# Patient Record
Sex: FEMALE | Race: BLACK OR AFRICAN AMERICAN | Employment: UNEMPLOYED | URBAN - METROPOLITAN AREA
[De-identification: names, ages, dates, MRNs, and addresses within clinical notes are randomized per-mention and may not be internally consistent; named-entity substitution may affect disease eponyms.]

---

## 2017-05-26 ENCOUNTER — OFFICE VISIT (OUTPATIENT)
Dept: INTERNAL MEDICINE CLINIC | Age: 37
End: 2017-05-26

## 2017-05-26 VITALS
TEMPERATURE: 96.3 F | DIASTOLIC BLOOD PRESSURE: 44 MMHG | RESPIRATION RATE: 17 BRPM | SYSTOLIC BLOOD PRESSURE: 93 MMHG | HEIGHT: 64 IN | BODY MASS INDEX: 25.99 KG/M2 | HEART RATE: 59 BPM | OXYGEN SATURATION: 100 % | WEIGHT: 152.2 LBS

## 2017-05-26 DIAGNOSIS — Z00.00 WELL ADULT ON ROUTINE HEALTH CHECK: Primary | ICD-10-CM

## 2017-05-26 NOTE — PROGRESS NOTES
Subjective:   31 y.o. female for Well Woman Check. No significant past medical history. Onychomycosis resolved. Sometimes her nose feels stuffy and some dry cough otherwise she is doing well. Her gyne and breast care is done elsewhere by her Ob-Gyne physician. Patient Active Problem List   Diagnosis Code    Onychomycosis B35.1     Current Outpatient Prescriptions   Medication Sig Dispense Refill    multivitamin (ONE A DAY) tablet Take 1 Tab by mouth daily.  ferrous sulfate 325 mg (65 mg iron) tablet Take  by mouth Daily (before breakfast).  ASCORBIC ACID/VITAMIN E/BIOTIN (HAIR, SKIN, NAILS WITH BIOTIN PO) Take  by mouth.  VIT A/VIT C/VIT E/ZINC/COPPER (OCUVITE PRESERVISION PO) Take  by mouth. No Known Allergies  Past Medical History:   Diagnosis Date    Anemia      Past Surgical History:   Procedure Laterality Date    HX  SECTION      HX REFRACTIVE SURGERY      HX WISDOM TEETH EXTRACTION       Social History   Substance Use Topics    Smoking status: Never Smoker    Smokeless tobacco: Never Used    Alcohol use No        Lab Results  Component Value Date/Time   Cholesterol, total 221 2017 08:57 AM   HDL Cholesterol 92 2017 08:57 AM   LDL, calculated 119 2017 08:57 AM   Triglyceride 48 2017 08:57 AM       Lab Results  Component Value Date/Time   TSH 1.100 2017 08:57 AM   T4, Free 1.05 2017 08:57 AM         ROS: Feeling generally well. No TIA's or unusual headaches, no dysphagia. No prolonged cough. No dyspnea or chest pain on exertion. No abdominal pain, change in bowel habits, black or bloody stools. No urinary tract symptoms. No new or unusual musculoskeletal symptoms. Specific concerns today: none. .    Objective: The patient appears well, alert, oriented x 3, in no distress.   Visit Vitals    BP 93/44 (BP 1 Location: Left arm, BP Patient Position: Sitting)    Pulse (!) 59    Temp 96.3 °F (35.7 °C) (Oral)    Resp 17    Ht 5' 3.5\" (1.613 m)    Wt 152 lb 3.2 oz (69 kg)    LMP 11/04/2016    SpO2 100%    BMI 26.54 kg/m2     ENT normal.  Neck supple. No adenopathy or thyromegaly. MARYURI. Lungs are clear, good air entry, no wheezes, rhonchi or rales. S1 and S2 normal, no murmurs, regular rate and rhythm. Abdomen soft without tenderness, guarding, mass or organomegaly. Extremities show no edema, normal peripheral pulses. Neurological is normal, no focal findings. Breast and Pelvic exams are deferred. Skin: normal.    Assessment/Plan:   Well Woman  increase physical activity, follow low fat diet, follow low salt diet, continue present plan, routine labs ordered, call if any problems    ICD-10-CM ICD-9-CM    1. Well adult on routine health check Z00.00 V70.0 CBC WITH AUTOMATED DIFF      UA/M W/RFLX CULTURE, COMP      TSH AND FREE T4      METABOLIC PANEL, COMPREHENSIVE      LIPID PANEL      HEMOGLOBIN A1C WITH EAG     Latasha Roach was seen today for complete physical and cold symptoms. Diagnoses and all orders for this visit:    Well adult on routine health check  -     CBC WITH AUTOMATED DIFF  -     UA/M W/RFLX CULTURE, COMP  -     TSH AND FREE T4  -     METABOLIC PANEL, COMPREHENSIVE  -     LIPID PANEL  -     HEMOGLOBIN A1C WITH EAG    Other orders  -     MICROSCOPIC EXAMINATION  -     CVD REPORT      Follow-up Disposition:  Return in about 1 year (around 5/26/2018). Sooner if needed.    reviewed diet, exercise and weight control

## 2017-05-26 NOTE — MR AVS SNAPSHOT
Visit Information Date & Time Provider Department Dept. Phone Encounter #  
 5/26/2017  8:15 AM Zeeshan Villatoro MD [facility] [phone] [id number] Follow-up Instructions Return in about 1 year (around 5/26/2018). Upcoming Health Maintenance Date Due DTaP/Tdap/Td series (1 - Tdap) 7/19/2017* INFLUENZA AGE 9 TO ADULT 8/1/2017 PAP AKA CERVICAL CYTOLOGY 1/5/2019 *Topic was postponed. The date shown is not the original due date. Allergies as of 5/26/2017  Review Complete On: 5/26/2017 By: Curtis Wheat LPN No Known Allergies Current Immunizations  Never Reviewed Name Date Influenza Vaccine (Quad) PF 10/14/2016 Not reviewed this visit You Were Diagnosed With   
  
 Codes Comments Well adult on routine health check    -  Primary ICD-10-CM: Z00.00 ICD-9-CM: V70.0 Vitals BP Pulse Temp Resp Height(growth percentile) Weight(growth percentile) 93/44 (BP 1 Location: Left arm, BP Patient Position: Sitting) (!) 59 96.3 °F (35.7 °C) (Oral) 17 5' 3.5\" (1.613 m) 152 lb 3.2 oz (69 kg) LMP SpO2 BMI OB Status Smoking Status 11/04/2016 100% 26.54 kg/m2 Implant Never Smoker Vitals History BMI and BSA Data Body Mass Index Body Surface Area  
 26.54 kg/m 2 1.76 m 2 Preferred Pharmacy Pharmacy Name Phone [facility] [address] [place] [address] [phone] Your Updated Medication List  
  
   
This list is accurate as of: 5/26/17  8:55 AM.  Always use your most recent med list.  
  
  
  
  
 efinaconazole Natalie topical solution Commonly known as:  Geovanny Sol Apply daily. ferrous sulfate 325 mg (65 mg iron) tablet Take  by mouth Daily (before breakfast). HAIR, SKIN, NAILS WITH BIOTIN PO Take  by mouth.  
  
 multivitamin tablet Commonly known as:  ONE A DAY Take 1 Tab by mouth daily. OCUVITE PRESERVISION PO Take  by mouth. We Performed the Following CBC WITH AUTOMATED DIFF [87924 CPT(R)] HEMOGLOBIN A1C WITH EAG [60843 CPT(R)] LIPID PANEL [46736 CPT(R)] METABOLIC PANEL, COMPREHENSIVE [56778 CPT(R)] TSH AND FREE T4 [93180 CPT(R)] UA/M W/RFLX CULTURE, COMP [44001 CPT(R)] Follow-up Instructions Return in about 1 year (around 5/26/2018). Introducing [facility]! Dear Sondra Busch: Thank you for requesting a [employer] account. Our records indicate that you already have an active [employer] account. You can access your account anytime at [email or web address] Did you know that you can access your hospital and ER discharge instructions at any time in [employer]? You can also review all of your test results from your hospital stay or ER visit. Additional Information If you have questions, please visit the Frequently Asked Questions section of the [employer] website at [email or web address] Remember, [employer] is NOT to be used for urgent needs. For medical emergencies, dial 911. Now available from your iPhone and Android! Please provide this summary of care documentation to your next provider. Your primary care clinician is listed as Zeeshan Merrill. If you have any questions after today's visit, please call [phone].

## 2017-05-27 LAB
ALBUMIN SERPL-MCNC: 4.4 G/DL (ref 3.5–5.5)
ALBUMIN/GLOB SERPL: 1.5 {RATIO} (ref 1.2–2.2)
ALP SERPL-CCNC: 39 IU/L (ref 39–117)
ALT SERPL-CCNC: 18 IU/L (ref 0–32)
APPEARANCE UR: ABNORMAL
AST SERPL-CCNC: 25 IU/L (ref 0–40)
BACTERIA #/AREA URNS HPF: NORMAL /[HPF]
BASOPHILS # BLD AUTO: 0 X10E3/UL (ref 0–0.2)
BASOPHILS NFR BLD AUTO: 1 %
BILIRUB SERPL-MCNC: 0.3 MG/DL (ref 0–1.2)
BILIRUB UR QL STRIP: NEGATIVE
BUN SERPL-MCNC: 10 MG/DL (ref 6–20)
BUN/CREAT SERPL: 15 (ref 9–23)
CALCIUM SERPL-MCNC: 9.3 MG/DL (ref 8.7–10.2)
CASTS URNS QL MICRO: NORMAL /LPF
CHLORIDE SERPL-SCNC: 101 MMOL/L (ref 96–106)
CHOLEST SERPL-MCNC: 221 MG/DL (ref 100–199)
CO2 SERPL-SCNC: 25 MMOL/L (ref 18–29)
COLOR UR: YELLOW
CREAT SERPL-MCNC: 0.67 MG/DL (ref 0.57–1)
EOSINOPHIL # BLD AUTO: 0.1 X10E3/UL (ref 0–0.4)
EOSINOPHIL NFR BLD AUTO: 1 %
EPI CELLS #/AREA URNS HPF: NORMAL /HPF
ERYTHROCYTE [DISTWIDTH] IN BLOOD BY AUTOMATED COUNT: 14.1 % (ref 12.3–15.4)
EST. AVERAGE GLUCOSE BLD GHB EST-MCNC: 126 MG/DL
GLOBULIN SER CALC-MCNC: 3 G/DL (ref 1.5–4.5)
GLUCOSE SERPL-MCNC: 77 MG/DL (ref 65–99)
GLUCOSE UR QL: NEGATIVE
HBA1C MFR BLD: 6 % (ref 4.8–5.6)
HCT VFR BLD AUTO: 39.8 % (ref 34–46.6)
HDLC SERPL-MCNC: 92 MG/DL
HGB BLD-MCNC: 12.6 G/DL (ref 11.1–15.9)
HGB UR QL STRIP: NEGATIVE
IMM GRANULOCYTES # BLD: 0 X10E3/UL (ref 0–0.1)
IMM GRANULOCYTES NFR BLD: 0 %
INTERPRETATION, 910389: NORMAL
KETONES UR QL STRIP: NEGATIVE
LDLC SERPL CALC-MCNC: 119 MG/DL (ref 0–99)
LEUKOCYTE ESTERASE UR QL STRIP: NEGATIVE
LYMPHOCYTES # BLD AUTO: 1.9 X10E3/UL (ref 0.7–3.1)
LYMPHOCYTES NFR BLD AUTO: 36 %
MCH RBC QN AUTO: 29.9 PG (ref 26.6–33)
MCHC RBC AUTO-ENTMCNC: 31.7 G/DL (ref 31.5–35.7)
MCV RBC AUTO: 95 FL (ref 79–97)
MICRO URNS: ABNORMAL
MICRO URNS: ABNORMAL
MONOCYTES # BLD AUTO: 0.5 X10E3/UL (ref 0.1–0.9)
MONOCYTES NFR BLD AUTO: 9 %
NEUTROPHILS # BLD AUTO: 2.9 X10E3/UL (ref 1.4–7)
NEUTROPHILS NFR BLD AUTO: 53 %
NITRITE UR QL STRIP: NEGATIVE
PH UR STRIP: 7.5 [PH] (ref 5–7.5)
PLATELET # BLD AUTO: 325 X10E3/UL (ref 150–379)
POTASSIUM SERPL-SCNC: 4.2 MMOL/L (ref 3.5–5.2)
PROT SERPL-MCNC: 7.4 G/DL (ref 6–8.5)
PROT UR QL STRIP: NEGATIVE
RBC # BLD AUTO: 4.21 X10E6/UL (ref 3.77–5.28)
RBC #/AREA URNS HPF: NORMAL /HPF
SODIUM SERPL-SCNC: 141 MMOL/L (ref 134–144)
SP GR UR: <=1.005 (ref 1–1.03)
T4 FREE SERPL-MCNC: 1.05 NG/DL (ref 0.82–1.77)
TRIGL SERPL-MCNC: 48 MG/DL (ref 0–149)
TSH SERPL DL<=0.005 MIU/L-ACNC: 1.1 UIU/ML (ref 0.45–4.5)
URINALYSIS REFLEX , 377201: ABNORMAL
UROBILINOGEN UR STRIP-MCNC: 0.2 MG/DL (ref 0.2–1)
VLDLC SERPL CALC-MCNC: 10 MG/DL (ref 5–40)
WBC # BLD AUTO: 5.4 X10E3/UL (ref 3.4–10.8)
WBC #/AREA URNS HPF: NORMAL /HPF

## 2017-05-31 NOTE — PROGRESS NOTES
Please call her. 1. CBC, kidney, liver, thyroid, UA level is within normal range. 2. Cholesterol level is  mildly elevated. No need to start any medication at this point. Continue watching your diet, eat healthy, less mac and fatty food, more baked or grilled or broiled food. Exercise 150 minutes/week will be helpful as well. Will recheck level in one year. 3. Prediabetes: Average blood sugar( Hg A1c ) is 6.0, in the range of prediabetic level. Prediabetes is a warning sign that you are at risk for getting type 2 diabetes. It means that your blood sugar is higher than it should be. Most people who get type 2 diabetes have prediabetes first. The good news is that lifestyle changes may help you get your blood sugar back to normal and avoid or delay diabetes. Will recheck this level in 6 months.

## 2017-09-09 ENCOUNTER — APPOINTMENT (OUTPATIENT)
Dept: CT IMAGING | Age: 37
End: 2017-09-09
Attending: EMERGENCY MEDICINE
Payer: COMMERCIAL

## 2017-09-09 ENCOUNTER — HOSPITAL ENCOUNTER (EMERGENCY)
Age: 37
Discharge: HOME | End: 2017-09-09
Attending: EMERGENCY MEDICINE
Payer: COMMERCIAL

## 2017-09-09 VITALS
OXYGEN SATURATION: 100 % | SYSTOLIC BLOOD PRESSURE: 132 MMHG | DIASTOLIC BLOOD PRESSURE: 67 MMHG | HEART RATE: 72 BPM | TEMPERATURE: 99.1 F | WEIGHT: 155.87 LBS | BODY MASS INDEX: 26.61 KG/M2 | RESPIRATION RATE: 18 BRPM | HEIGHT: 64 IN

## 2017-09-09 DIAGNOSIS — K46.0 INCARCERATED HERNIA: Primary | ICD-10-CM

## 2017-09-09 LAB
ALBUMIN SERPL-MCNC: 3.6 G/DL (ref 3.5–5)
ALBUMIN/GLOB SERPL: 0.9 {RATIO} (ref 1.1–2.2)
ALP SERPL-CCNC: 40 U/L (ref 45–117)
ALT SERPL-CCNC: 25 U/L (ref 12–78)
ANION GAP SERPL CALC-SCNC: 4 MMOL/L (ref 5–15)
AST SERPL-CCNC: 19 U/L (ref 15–37)
BASOPHILS # BLD: 0 K/UL (ref 0–0.1)
BASOPHILS NFR BLD: 0 % (ref 0–1)
BILIRUB SERPL-MCNC: 0.3 MG/DL (ref 0.2–1)
BUN SERPL-MCNC: 13 MG/DL (ref 6–20)
BUN/CREAT SERPL: 15 (ref 12–20)
CALCIUM SERPL-MCNC: 8.6 MG/DL (ref 8.5–10.1)
CHLORIDE SERPL-SCNC: 104 MMOL/L (ref 97–108)
CO2 SERPL-SCNC: 29 MMOL/L (ref 21–32)
CREAT BLD-MCNC: 0.8 MG/DL (ref 0.6–1.3)
CREAT SERPL-MCNC: 0.87 MG/DL (ref 0.55–1.02)
DIFFERENTIAL METHOD BLD: NORMAL
EOSINOPHIL # BLD: 0.1 K/UL (ref 0–0.4)
EOSINOPHIL NFR BLD: 1 % (ref 0–7)
ERYTHROCYTE [DISTWIDTH] IN BLOOD BY AUTOMATED COUNT: 13.3 % (ref 11.5–14.5)
GLOBULIN SER CALC-MCNC: 3.8 G/DL (ref 2–4)
GLUCOSE SERPL-MCNC: 85 MG/DL (ref 65–100)
HCT VFR BLD AUTO: 35.7 % (ref 35–47)
HGB BLD-MCNC: 11.7 G/DL (ref 11.5–16)
LYMPHOCYTES # BLD: 2.4 K/UL (ref 0.8–3.5)
LYMPHOCYTES NFR BLD: 46 % (ref 12–49)
MCH RBC QN AUTO: 29.6 PG (ref 26–34)
MCHC RBC AUTO-ENTMCNC: 32.8 G/DL (ref 30–36.5)
MCV RBC AUTO: 90.4 FL (ref 80–99)
MONOCYTES # BLD: 0.6 K/UL (ref 0–1)
MONOCYTES NFR BLD: 10 % (ref 5–13)
NEUTS SEG # BLD: 2.4 K/UL (ref 1.8–8)
NEUTS SEG NFR BLD: 43 % (ref 32–75)
PLATELET # BLD AUTO: 300 K/UL (ref 150–400)
POTASSIUM SERPL-SCNC: 3.4 MMOL/L (ref 3.5–5.1)
PROT SERPL-MCNC: 7.4 G/DL (ref 6.4–8.2)
RBC # BLD AUTO: 3.95 M/UL (ref 3.8–5.2)
RBC MORPH BLD: NORMAL
SODIUM SERPL-SCNC: 137 MMOL/L (ref 136–145)
WBC # BLD AUTO: 5.5 K/UL (ref 3.6–11)

## 2017-09-09 PROCEDURE — 80053 COMPREHEN METABOLIC PANEL: CPT | Performed by: EMERGENCY MEDICINE

## 2017-09-09 PROCEDURE — 36415 COLL VENOUS BLD VENIPUNCTURE: CPT | Performed by: EMERGENCY MEDICINE

## 2017-09-09 PROCEDURE — 74177 CT ABD & PELVIS W/CONTRAST: CPT

## 2017-09-09 PROCEDURE — 82565 ASSAY OF CREATININE: CPT

## 2017-09-09 PROCEDURE — 85025 COMPLETE CBC W/AUTO DIFF WBC: CPT | Performed by: EMERGENCY MEDICINE

## 2017-09-09 PROCEDURE — 74011250636 HC RX REV CODE- 250/636: Performed by: EMERGENCY MEDICINE

## 2017-09-09 PROCEDURE — 96374 THER/PROPH/DIAG INJ IV PUSH: CPT

## 2017-09-09 PROCEDURE — 99283 EMERGENCY DEPT VISIT LOW MDM: CPT

## 2017-09-09 PROCEDURE — 74011636320 HC RX REV CODE- 636/320: Performed by: EMERGENCY MEDICINE

## 2017-09-09 RX ORDER — SODIUM CHLORIDE 9 MG/ML
50 INJECTION, SOLUTION INTRAVENOUS
Status: COMPLETED | OUTPATIENT
Start: 2017-09-09 | End: 2017-09-09

## 2017-09-09 RX ORDER — HYDROCODONE BITARTRATE AND ACETAMINOPHEN 5; 325 MG/1; MG/1
1 TABLET ORAL
Qty: 20 TAB | Refills: 0 | Status: SHIPPED | OUTPATIENT
Start: 2017-09-09 | End: 2018-05-31

## 2017-09-09 RX ORDER — FENTANYL CITRATE 50 UG/ML
50 INJECTION, SOLUTION INTRAMUSCULAR; INTRAVENOUS
Status: COMPLETED | OUTPATIENT
Start: 2017-09-09 | End: 2017-09-09

## 2017-09-09 RX ORDER — SODIUM CHLORIDE 0.9 % (FLUSH) 0.9 %
10 SYRINGE (ML) INJECTION
Status: COMPLETED | OUTPATIENT
Start: 2017-09-09 | End: 2017-09-09

## 2017-09-09 RX ADMIN — SODIUM CHLORIDE 50 ML/HR: 900 INJECTION, SOLUTION INTRAVENOUS at 20:09

## 2017-09-09 RX ADMIN — IOPAMIDOL 100 ML: 755 INJECTION, SOLUTION INTRAVENOUS at 20:09

## 2017-09-09 RX ADMIN — Medication 10 ML: at 20:09

## 2017-09-09 RX ADMIN — FENTANYL CITRATE 50 MCG: 50 INJECTION, SOLUTION INTRAMUSCULAR; INTRAVENOUS at 18:43

## 2017-09-09 NOTE — ED TRIAGE NOTES
\"I went to [facility] and the doctor sent me here so a surgeon can look at me, he things I have a strangulated hernia. \" Patient has moderate sized lump above umbilicus, tender to palpation. Last BM was yesterday. Nausea with eating. Patient first began having symptoms last Thursday (10 days ago).

## 2017-09-10 NOTE — ED PROVIDER NOTES
HPI Comments: Dot Holman is a 31 y.o. female with hx of  and anemia who presents ambulatory to the ED c/o knot to abdomen since 17. Pt states the knot has become uncomfortable and painful recently, reaching to the point where pt had difficulty ambulating. She notes feeling nauseas with eating. She states she has been passing flatulence and BM normally. Pt denies taking pain medications. She reports visiting [facility] today. The physician referred the pt to the ED to check for a possible hernia. Pt specifically denies fever, chills, congestion, sore throat, rhinorrhea, cough, SOB, CP, vomiting, diarrhea, dysuria, myalgias, HA, dizziness, and lightheadedness. She denies chance of pregnancy stating she is currently on her menstrual cycle. Social hx: - Tobacco use, - EtOH use, - Illicit drug use    PCP: Hector Bernabe MD    There are no other complaints, changes or physical findings at this time. The history is provided by the patient. No  was used. Past Medical History:   Diagnosis Date    Anemia        Past Surgical History:   Procedure Laterality Date    HX  SECTION      HX REFRACTIVE SURGERY      HX WISDOM TEETH EXTRACTION           Family History:   Problem Relation Age of Onset    Hypertension Mother     Hypertension Father     Stroke Paternal Grandmother     Diabetes Paternal Grandmother     Stroke Paternal Grandfather     Cancer Maternal Aunt 60     breast cancer       Social History     Social History    Marital status:      Spouse name: N/A    Number of children: N/A    Years of education: N/A     Occupational History    Not on file.      Social History Main Topics    Smoking status: Never Smoker    Smokeless tobacco: Never Used    Alcohol use No    Drug use: No    Sexual activity: Yes     Partners: Male     Birth control/ protection: Implant     Other Topics Concern    Not on file     Social History Narrative ALLERGIES: Review of patient's allergies indicates no known allergies. Review of Systems   Constitutional: Negative. Negative for chills, diaphoresis and fever. HENT: Negative. Negative for congestion, sore throat and trouble swallowing. Eyes: Negative. Negative for photophobia, pain and redness. Respiratory: Negative. Negative for cough, chest tightness, shortness of breath and wheezing. Cardiovascular: Negative. Negative for chest pain and palpitations. Gastrointestinal: Positive for abdominal pain (associated with knot) and nausea. Negative for blood in stool, constipation, diarrhea and vomiting. Genitourinary: Negative for difficulty urinating, dysuria and frequency. Musculoskeletal: Negative. Negative for arthralgias, myalgias, neck pain and neck stiffness. Skin: Negative. Neurological: Negative. Negative for dizziness, tremors, seizures, syncope, speech difficulty, weakness, light-headedness, numbness and headaches. Psychiatric/Behavioral: Negative. Negative for confusion. The patient is not nervous/anxious. All other systems reviewed and are negative. Patient Vitals for the past 12 hrs:   Temp Pulse Resp BP SpO2   09/09/17 2041 - - - - 100 %   09/09/17 2011 - - - - 100 %   09/09/17 1754 99.1 °F (37.3 °C) 72 18 132/67 100 %       Physical Exam   Constitutional: She is oriented to person, place, and time. She appears well-developed and well-nourished. HENT:   Head: Normocephalic and atraumatic. Eyes: Conjunctivae and EOM are normal.   Neck: Normal range of motion. Neck supple. Cardiovascular: Normal rate and regular rhythm. Pulmonary/Chest: Effort normal and breath sounds normal. No respiratory distress. Abdominal: Soft. She exhibits no distension. Ventral hernia superior to umbilicus. Exquisitely TTP. Unable to reduce. Musculoskeletal: Normal range of motion. Neurological: She is alert and oriented to person, place, and time.    Skin: Skin is warm and dry. Psychiatric: She has a normal mood and affect. Nursing note and vitals reviewed. MDM  Number of Diagnoses or Management Options  Incarcerated hernia:   Diagnosis management comments: Patient presents with abdominal pain. DDx: Gastroenteritis, SBO, appendicitis, colitis, IBD, diverticulitis, mesenteric ischemia, AAA or descending dissection, ACS, ureteral stone, incarcerated vs. Strangulated hernia Will get labs and CT Abdomen. Amount and/or Complexity of Data Reviewed  Clinical lab tests: reviewed and ordered  Tests in the radiology section of CPT®: ordered and reviewed  Review and summarize past medical records: yes    Patient Progress  Patient progress: stable    ED Course       Procedures    LABORATORY TESTS:  Recent Results (from the past 12 hour(s))   CBC WITH AUTOMATED DIFF    Collection Time: 09/09/17  6:31 PM   Result Value Ref Range    WBC 5.5 3.6 - 11.0 K/uL    RBC 3.95 3.80 - 5.20 M/uL    HGB 11.7 11.5 - 16.0 g/dL    HCT 35.7 35.0 - 47.0 %    MCV 90.4 80.0 - 99.0 FL    MCH 29.6 26.0 - 34.0 PG    MCHC 32.8 30.0 - 36.5 g/dL    RDW 13.3 11.5 - 14.5 %    PLATELET 662 771 - 492 K/uL    NEUTROPHILS 43 32 - 75 %    LYMPHOCYTES 46 12 - 49 %    MONOCYTES 10 5 - 13 %    EOSINOPHILS 1 0 - 7 %    BASOPHILS 0 0 - 1 %    ABS. NEUTROPHILS 2.4 1.8 - 8.0 K/UL    ABS. LYMPHOCYTES 2.4 0.8 - 3.5 K/UL    ABS. MONOCYTES 0.6 0.0 - 1.0 K/UL    ABS. EOSINOPHILS 0.1 0.0 - 0.4 K/UL    ABS.  BASOPHILS 0.0 0.0 - 0.1 K/UL    DF SMEAR SCANNED      RBC COMMENTS NORMOCYTIC, NORMOCHROMIC     METABOLIC PANEL, COMPREHENSIVE    Collection Time: 09/09/17  6:31 PM   Result Value Ref Range    Sodium 137 136 - 145 mmol/L    Potassium 3.4 (L) 3.5 - 5.1 mmol/L    Chloride 104 97 - 108 mmol/L    CO2 29 21 - 32 mmol/L    Anion gap 4 (L) 5 - 15 mmol/L    Glucose 85 65 - 100 mg/dL    BUN 13 6 - 20 MG/DL    Creatinine 0.87 0.55 - 1.02 MG/DL    BUN/Creatinine ratio 15 12 - 20      GFR est AA >60 >60 ml/min/1.73m2    GFR est non-AA >60 >60 ml/min/1.73m2    Calcium 8.6 8.5 - 10.1 MG/DL    Bilirubin, total 0.3 0.2 - 1.0 MG/DL    ALT (SGPT) 25 12 - 78 U/L    AST (SGOT) 19 15 - 37 U/L    Alk. phosphatase 40 (L) 45 - 117 U/L    Protein, total 7.4 6.4 - 8.2 g/dL    Albumin 3.6 3.5 - 5.0 g/dL    Globulin 3.8 2.0 - 4.0 g/dL    A-G Ratio 0.9 (L) 1.1 - 2.2     POC CREATININE    Collection Time: 09/09/17  6:32 PM   Result Value Ref Range    Creatinine (POC) 0.8 0.6 - 1.3 MG/DL    GFRAA, POC >60 >60 ml/min/1.73m2    GFRNA, POC >60 >60 ml/min/1.73m2       IMAGING RESULTS:  CT Results  (Last 48 hours)               09/09/17 2009  CT ABD PELV W CONT Final result    Impression:  IMPRESSION:   Incarcerated ventral hernia. Narrative:  EXAM:  CT ABD PELV W CONT       INDICATION: umbilical hernia not reducible, painful       COMPARISON: None       CONTRAST:  97 mL of Isovue-370. TECHNIQUE:    Following the uneventful intravenous administration of contrast, thin axial   images were obtained through the abdomen and pelvis. Coronal and sagittal   reconstructions were generated. Oral contrast was not administered. CT dose   reduction was achieved through use of a standardized protocol tailored for this   examination and automatic exposure control for dose modulation. FINDINGS:    LUNG BASES: Clear. INCIDENTALLY IMAGED HEART AND MEDIASTINUM: Unremarkable. LIVER: No mass or biliary dilatation. GALLBLADDER: Unremarkable. SPLEEN: No mass. PANCREAS: No mass or ductal dilatation. ADRENALS: Unremarkable. KIDNEYS: No mass, calculus, or hydronephrosis. STOMACH: Unremarkable. SMALL BOWEL: No dilatation or wall thickening. COLON: No dilatation or wall thickening. APPENDIX: Unremarkable. PERITONEUM: No ascites or pneumoperitoneum. RETROPERITONEUM: No lymphadenopathy or aortic aneurysm. REPRODUCTIVE ORGANS: Normal.   URINARY BLADDER: No mass or calculus. BONES: No destructive bone lesion.    ADDITIONAL COMMENTS: There is a ventral hernia with a 4 mm neck. The hernia sac   measures 3.4x2.2 x 2.1. There is adipose and fluid within the sac. There is no   associated bowel within the hernia sac. MEDICATIONS GIVEN:  Medications   fentaNYL citrate (PF) injection 50 mcg (50 mcg IntraVENous Given 9/9/17 1843)   0.9% sodium chloride infusion (0 mL/hr IntraVENous IV Completed 9/9/17 2048)   iopamidol (ISOVUE-370) 76 % injection 100 mL (100 mL IntraVENous Given 9/9/17 2009)   sodium chloride (NS) flush 10 mL (10 mL IntraVENous Given 9/9/17 2009)       IMPRESSION:  1. Incarcerated hernia        PLAN: Discharge home  1. Discharge Medication List as of 9/9/2017  8:44 PM      START taking these medications    Details   HYDROcodone-acetaminophen (NORCO) 5-325 mg per tablet Take 1 Tab by mouth every four (4) hours as needed for Pain. Max Daily Amount: 6 Tabs., Print, Disp-20 Tab, R-0         CONTINUE these medications which have NOT CHANGED    Details   multivitamin (ONE A DAY) tablet Take 1 Tab by mouth daily. , Historical Med      ferrous sulfate 325 mg (65 mg iron) tablet Take  by mouth Daily (before breakfast). , Historical Med      ASCORBIC ACID/VITAMIN E/BIOTIN (HAIR, SKIN, NAILS WITH BIOTIN PO) Take  by mouth., Historical Med      VIT A/VIT C/VIT E/ZINC/COPPER (OCUVITE PRESERVISION PO) Take  by mouth., Historical Med           2. Follow-up Information     Follow up With Details Comments [address], [place] Schedule an appointment as soon as possible for a visit  [address] [place] [address] [address]  [phone]          3. Return to ED if worse     DISCHARGE NOTE  20:44  The patient has been re-evaluated and is ready for discharge. Reviewed available results with patient. Counseled pt on diagnosis and care plan. Pt has expressed understanding, and all questions have been answered. Pt agrees with plan and agrees to F/U as recommended, or return to the ED if their sxs worsen.  Discharge instructions have been provided and explained to the pt, along with reasons to return to the ED. This note is prepared by Ashley Grimaldo, acting as Scribe for Griselda Sheth M.D. Griselda Sheth M.D: The scribe's documentation has been prepared under my direction and personally reviewed by me in its entirety. I confirm that the note above accurately reflects all work, treatment, procedures, and medical decision making performed by me.

## 2017-09-10 NOTE — ED NOTES
Discharge instructions reviewed with pt and copy given along with RX by ER MD Daljit Sotomayor. Patient ambulatory from ED accompanied by parent in no sign of distress or discomfort.

## 2017-09-10 NOTE — DISCHARGE INSTRUCTIONS
Hernia Repair: Before Your Surgery  What is hernia repair surgery? A hernia occurs when a weak spot in your belly muscles allows a piece of your intestines or the tissue around them to poke through. This can cause a bulge in the area. It can also cause pain. But you may not feel anything. The hernia may be in your groin. Or it may be near your belly button. In some cases, it's in a scar from an earlier surgery. A doctor can fix a hernia through a cut (incision) made near it. This is called open surgery. Or the doctor may make some very small cuts and use a thin, lighted scope and small tools. This is laparoscopic surgery. If your hernia is bulging, the bulge is pushed back into place. The doctor then sews the healthy tissue back together. Often a piece of material is used to patch the weak spot. Open surgery will leave a longer scar. Laparoscopic surgery leaves a few small scars. The scars will fade with time. You may need to take 1 to 2 weeks off from work. This depends on the type of work you do and how you feel. Follow-up care is a key part of your treatment and safety. Be sure to make and go to all appointments, and call your doctor if you are having problems. It's also a good idea to know your test results and keep a list of the medicines you take. What happens before surgery? Surgery can be stressful. This information will help you understand what you can expect. And it will help you safely prepare for surgery. Preparing for surgery  · Understand exactly what surgery is planned, along with the risks, benefits, and other options. · Tell your doctors ALL the medicines, vitamins, supplements, and herbal remedies you take. Some of these can increase the risk of bleeding or interact with anesthesia. · If you take blood thinners, such as warfarin (Coumadin), clopidogrel (Plavix), or aspirin, be sure to talk to your doctor.  He or she will tell you if you should stop taking these medicines before your surgery. Make sure that you understand exactly what your doctor wants you to do. · Your doctor will tell you which medicines to take or stop before your surgery. You may need to stop taking certain medicines a week or more before surgery. So talk to your doctor as soon as you can. · If you have an advance directive, let your doctor know. It may include a living will and a durable power of  for health care. Bring a copy to the hospital. If you don't have one, you may want to prepare one. It lets your doctor and loved ones know your health care wishes. Doctors advise that everyone prepare these papers before any type of surgery or procedure. What happens on the day of surgery? · Follow the instructions exactly about when to stop eating and drinking. If you don't, your surgery may be canceled. If your doctor told you to take your medicines on the day of surgery, take them with only a sip of water. · Take a bath or shower before you come in for your surgery. Do not apply lotions, perfumes, deodorants, or nail polish. · Do not shave the surgical site yourself. · Take off all jewelry and piercings. And take out contact lenses, if you wear them. At the hospital or surgery center  · Bring a picture ID. · The area for surgery is often marked to make sure there are no errors. · You will be kept comfortable and safe by your anesthesia provider. You will be asleep during the surgery. · The surgery will take about 30 minutes to 2 hours. This depends on the size of the hernia and where it is. Going home  · Be sure you have someone to drive you home. Anesthesia and pain medicine make it unsafe for you to drive. · You will be given more specific instructions about recovering from your surgery. They will cover things like diet, wound care, follow-up care, driving, and getting back to your normal routine. When should you call your doctor? · You have questions or concerns.   · You don't understand how to prepare for your surgery. · You become ill before the surgery (such as fever, flu, or a cold). · You need to reschedule or have changed your mind about having the surgery. Where can you learn more? Go to http://bel-clark.info/. Enter T831 in the search box to learn more about \"Hernia Repair: Before Your Surgery. \"  Current as of: August 9, 2016  Content Version: 11.3  © 3427-0156 [employer]. Care instructions adapted under license by [employer] (which disclaims liability or warranty for this information). If you have questions about a medical condition or this instruction, always ask your healthcare professional. [place] [address] any warranty or liability for your use of this information.

## 2017-09-15 ENCOUNTER — OFFICE VISIT (OUTPATIENT)
Dept: SURGERY | Age: 37
End: 2017-09-15

## 2017-09-15 VITALS
RESPIRATION RATE: 18 BRPM | WEIGHT: 152.6 LBS | DIASTOLIC BLOOD PRESSURE: 66 MMHG | OXYGEN SATURATION: 100 % | HEART RATE: 70 BPM | HEIGHT: 64 IN | SYSTOLIC BLOOD PRESSURE: 97 MMHG | BODY MASS INDEX: 26.05 KG/M2

## 2017-09-15 DIAGNOSIS — K42.9 UMBILICAL HERNIA WITHOUT OBSTRUCTION AND WITHOUT GANGRENE: ICD-10-CM

## 2017-09-15 DIAGNOSIS — K43.9 VENTRAL HERNIA WITHOUT OBSTRUCTION OR GANGRENE: Primary | ICD-10-CM

## 2017-09-15 NOTE — MR AVS SNAPSHOT
Visit Information Date & Time Provider Department Dept. Phone Encounter #  
 9/15/2017  3:40 PM Rainer Nichols MD [facility] [id number] Upcoming Health Maintenance Date Due DTaP/Tdap/Td series (1 - Tdap) 11/18/2003 INFLUENZA AGE 9 TO ADULT 8/1/2017 PAP AKA CERVICAL CYTOLOGY 1/5/2019 Allergies as of 9/15/2017  Review Complete On: 9/15/2017 By: Caty Mari No Known Allergies Current Immunizations  Never Reviewed Name Date Influenza Vaccine (Quad) PF 10/14/2016 Not reviewed this visit You Were Diagnosed With   
  
 Codes Comments Ventral hernia without obstruction or gangrene    -  Primary ICD-10-CM: K43.9 ICD-9-CM: 553.20 Umbilical hernia without obstruction and without gangrene     ICD-10-CM: K42.9 ICD-9-CM: 553.1 Vitals BP Pulse Resp Height(growth percentile) Weight(growth percentile) SpO2  
 97/66 (BP 1 Location: Left arm, BP Patient Position: Sitting) 70 18 5' 3.5\" (1.613 m) 152 lb 9.6 oz (69.2 kg) 100% BMI OB Status Smoking Status 26.61 kg/m2 Implant Never Smoker BMI and BSA Data Body Mass Index Body Surface Area  
 26.61 kg/m 2 1.76 m 2 Preferred Pharmacy Pharmacy Name Phone [facility] [address] [place] [address] [phone] Your Updated Medication List  
  
   
This list is accurate as of: 9/15/17 11:59 PM.  Always use your most recent med list.  
  
  
  
  
 ferrous sulfate 325 mg (65 mg iron) tablet Take  by mouth Daily (before breakfast). HAIR, SKIN, NAILS WITH BIOTIN PO Take  by mouth. HYDROcodone-acetaminophen 5-325 mg per tablet Commonly known as:  Rhenda Varner Take 1 Tab by mouth every four (4) hours as needed for Pain. Max Daily Amount: 6 Tabs. multivitamin tablet Commonly known as:  ONE A DAY Take 1 Tab by mouth daily. OCUVITE PRESERVISION PO Take  by mouth. Patient Instructions Surgery Instruction Sheet You have been scheduled for surgery on 12/21/2017 at 1:00 PM at [facility].. Please report to the Surgery Center at 11:00 AM , this is approximately 2 hours prior to your surgery time. The Surgery Center is located on the [address] [place] [address] Street side of the hospital, just next to the Emergency Room. Reserved parking is available and  parking if lot is full. - You will not need to have a pre-op visit before surgery, but the Pre-op Nurse will call you before surgery. The Pre-op nurse will review your medical history, medications and give you additional instructions. They will also confirm your arrival time. Call your physician immediately if you notice a change in your health between the time you saw your physician and the day of surgery. If you take a blood thinner, please let us know. Call your ordering Doctor to make sure you can stop taking it prior to your surgery. STOP YOUR ASPIRIN 10 DAYS PRIOR TO SURGERY. DO NOT TAKE  IBUPROFEN, ADVIL, MOTRIN, ALEVE, EXCEDRIN, BC POWDER, GOODIES, FISH OIL OR ANY MEDICATION CONTAINING ASPIRIN 10 DAYS PRIOR TO YOUR SURGERY. MAY TAKE TYLENOL. Eat a light dinner the evening before your surgery. DO NOT EAT OR DRINK ANYTHING AFTER MIDNIGHT THE NIGHT BEFORE YOUR SURGERY. This includes water, chewing gum, lifesavers, etc.  The Pre op nurse will check with you about any medication that you may need to take the morning of surgery. Shower with a new bar of anti-bacterial soap (Dial, Safeguard) or solution given to you by Pre-op, the night before surgery. Do not use lotion, powder, deodorant on the skin after showering.   Wear loose, comfortable clothing the day of surgery and bring a container to store your contacts, eyeglasses, dentures, hearing aid, etc.  Do not bring money, valuables, jewelry, etc. to the hospital.   
 
 If you are having outpatient surgery, someone must come with you the morning of surgery to drive you home. You can not drive for 24 hours after any anesthesia. Sometimes it is necessary to stay overnight and leave the next morning. This is still considered outpatient for most insurance deductibles. Someone will still need to drive you home. If you have questions or concerns, please feel free to call Dr Daniel Garcia at [phone]. If you need to cancel your surgery, please call as soon as possible. Introducing [facility] SERVICES! Dear Octavio Brumfield: Thank you for requesting a [employer] account. Our records indicate that you already have an active Appington account. You can access your account anytime at [email or web address] Did you know that you can access your hospital and ER discharge instructions at any time in Appington? You can also review all of your test results from your hospital stay or ER visit. Additional Information If you have questions, please visit the Frequently Asked Questions section of the App[employer] website at [email or web address] Remember, [employer] is NOT to be used for urgent needs. For medical emergencies, dial 911. Now available from your iPhone and Android! Please provide this summary of care documentation to your next provider. Your primary care clinician is listed as Zeeshan Merrill. If you have any questions after today's visit, please call [phone].

## 2017-09-18 NOTE — PROGRESS NOTES
Surgery Consult:  Ventral hernai  Requesting physician:  ER    Subjective:   Patient 31 y.o. female was seen in ER on 17 with painful \"knot\" in the epigastric area. Patient initially noticed it on 17. Patient describes the pain as crampy without radiation. Patient reports that her pain worse after she eats. Patient also complains of intermittent nausea but no emesis. Patient denies any recent trauma to this area. No change in bowel habits. No diarrhea or constipation. Prior abdominal surgeries include . Patient underwent CT scan in ER and it showed ventral hernia with 4 mm neck with hernia sac measuring 3.4 x 2.2 x 2.1 cm. I personally reviewed the CT scan and patient also noted to have small umbilical hernia with approximately 5 cm defect. Past Medical & Surgical History:  Past Medical History:   Diagnosis Date    Anemia       Past Surgical History:   Procedure Laterality Date    HX  SECTION      HX REFRACTIVE SURGERY      HX WISDOM TEETH EXTRACTION         Social History:  Social History     Social History    Marital status:      Spouse name: N/A    Number of children: N/A    Years of education: N/A     Occupational History    Not on file. Social History Main Topics    Smoking status: Never Smoker    Smokeless tobacco: Never Used    Alcohol use No    Drug use: No    Sexual activity: Yes     Partners: Male     Birth control/ protection: Implant     Other Topics Concern    Not on file     Social History Narrative        Family History:  Family History   Problem Relation Age of Onset    Hypertension Mother     Hypertension Father     Stroke Paternal Grandmother     Diabetes Paternal Grandmother     Stroke Paternal Grandfather     Cancer Maternal Aunt 60     breast cancer        Medications:  Current Outpatient Prescriptions   Medication Sig    multivitamin (ONE A DAY) tablet Take 1 Tab by mouth daily.     ferrous sulfate 325 mg (65 mg iron) tablet Take  by mouth Daily (before breakfast).  ASCORBIC ACID/VITAMIN E/BIOTIN (HAIR, SKIN, NAILS WITH BIOTIN PO) Take  by mouth.  VIT A/VIT C/VIT E/ZINC/COPPER (OCUVITE PRESERVISION PO) Take  by mouth.  HYDROcodone-acetaminophen (NORCO) 5-325 mg per tablet Take 1 Tab by mouth every four (4) hours as needed for Pain. Max Daily Amount: 6 Tabs. No current facility-administered medications for this visit. Allergies:  No Known Allergies    Review of Systems  A comprehensive review of systems was negative except for that written in the HPI. Objective:     Exam:    Visit Vitals    BP 97/66 (BP 1 Location: Left arm, BP Patient Position: Sitting)    Pulse 70    Resp 18    Ht 5' 3.5\" (1.613 m)    Wt 69.2 kg (152 lb 9.6 oz)    SpO2 100%    BMI 26.61 kg/m2     General appearance: alert, cooperative, no distress, appears stated age  Eyes: negative  Lungs: clear to auscultation bilaterally  Heart: regular rate and rhythm  Abdomen: soft, non-tender. Non-distended. Non-reducible epigastric bulge measuring 2 x 2 cm. Unable to appreciate the size of the defect. Small umbilical hernia with 1 cm bulge, reducible. Patient with approximately 0.5 cm fascial defect. Extremities: extremities normal, atraumatic, no cyanosis or edema. IQRA. Skin: Skin color, texture, turgor normal. No rashes or lesions. Neurologic: Grossly normal      Assessment:     Ventral hernia, non-reducible  Umbilical hernia    Plan:     Radha Frye [facility] and R  Risks, benefit, and alternative to surgery was discussed with the patient. The risks of surgery include but not limited to infection, bleeding, intraabdominal organ injury, recurrence, possible conversion to open, and the risks of general anesthetic. Patient will check her schedule and call back. All questions answered.

## 2017-09-19 ENCOUNTER — TELEPHONE (OUTPATIENT)
Dept: SURGERY | Age: 37
End: 2017-09-19

## 2017-09-20 NOTE — PATIENT INSTRUCTIONS
Surgery Instruction Sheet    You have been scheduled for surgery on 12/21/2017 at 1:00 PM at [facility]. Please report to the Surgery Center at 11:00 AM , this is approximately 2 hours prior to your surgery time. The Surgery Center is located on the [address] [place] [address] Street side of the hospital, just next to the Emergency Room. Reserved parking is available and  parking if lot is full. - You will not need to have a pre-op visit before surgery, but the Pre-op Nurse will call you before surgery. The Pre-op nurse will review your medical history, medications and give you additional instructions. They will also confirm your arrival time. Call your physician immediately if you notice a change in your health between the time you saw your physician and the day of surgery. If you take a blood thinner, please let us know. Call your ordering Doctor to make sure you can stop taking it prior to your surgery. STOP YOUR ASPIRIN 10 DAYS PRIOR TO SURGERY. DO NOT TAKE  IBUPROFEN, ADVIL, MOTRIN, ALEVE, EXCEDRIN, BC POWDER, GOODIES, FISH OIL OR ANY MEDICATION CONTAINING ASPIRIN 10 DAYS PRIOR TO YOUR SURGERY. MAY TAKE TYLENOL. Eat a light dinner the evening before your surgery. DO NOT EAT OR DRINK ANYTHING AFTER MIDNIGHT THE NIGHT BEFORE YOUR SURGERY. This includes water, chewing gum, lifesavers, etc.  The Pre op nurse will check with you about any medication that you may need to take the morning of surgery. Shower with a new bar of anti-bacterial soap (Dial, Safeguard) or solution given to you by Pre-op, the night before surgery. Do not use lotion, powder, deodorant on the skin after showering.   Wear loose, comfortable clothing the day of surgery and bring a container to store your contacts, eyeglasses, dentures, hearing aid, etc.  Do not bring money, valuables, jewelry, etc. to the hospital.      If you are having outpatient surgery, someone must come with you the morning of surgery to drive you home. You can not drive for 24 hours after any anesthesia. Sometimes it is necessary to stay overnight and leave the next morning. This is still considered outpatient for most insurance deductibles. Someone will still need to drive you home. If you have questions or concerns, please feel free to call Dr Loli Foley at [phone]. If you need to cancel your surgery, please call as soon as possible.

## 2017-09-20 NOTE — TELEPHONE ENCOUNTER
Returned call to patient . Requesting surgery for 12/21/2017, surgery is posted. Will mail instructions to home address on file. All questions answered.

## 2017-11-07 ENCOUNTER — TELEPHONE (OUTPATIENT)
Dept: SURGERY | Age: 37
End: 2017-11-07

## 2017-11-07 NOTE — TELEPHONE ENCOUNTER
Patient wants to cancel her upcoming surgery. Her gyno recommended her to wait until after she has a baby.

## 2017-11-08 NOTE — TELEPHONE ENCOUNTER
Per patient request, ventral hernia / UHR surgery for 12/21/17 is cancelled. Left voice message to contact our office when ready to reschedule.

## 2018-05-31 ENCOUNTER — OFFICE VISIT (OUTPATIENT)
Dept: INTERNAL MEDICINE CLINIC | Age: 38
End: 2018-05-31

## 2018-05-31 VITALS
HEIGHT: 64 IN | SYSTOLIC BLOOD PRESSURE: 103 MMHG | TEMPERATURE: 97.1 F | BODY MASS INDEX: 27.83 KG/M2 | DIASTOLIC BLOOD PRESSURE: 64 MMHG | RESPIRATION RATE: 17 BRPM | WEIGHT: 163 LBS | OXYGEN SATURATION: 100 % | HEART RATE: 63 BPM

## 2018-05-31 DIAGNOSIS — Z00.00 WELL ADULT HEALTH CHECK: Primary | ICD-10-CM

## 2018-05-31 NOTE — PATIENT INSTRUCTIONS
Body Mass Index: Care Instructions  Your Care Instructions    Body mass index (BMI) can help you see if your weight is raising your risk for health problems. It uses a formula to compare how much you weigh with how tall you are. · A BMI lower than 18.5 is considered underweight. · A BMI between 18.5 and 24.9 is considered healthy. · A BMI between 25 and 29.9 is considered overweight. A BMI of 30 or higher is considered obese. If your BMI is in the normal range, it means that you have a lower risk for weight-related health problems. If your BMI is in the overweight or obese range, you may be at increased risk for weight-related health problems, such as high blood pressure, heart disease, stroke, arthritis or joint pain, and diabetes. If your BMI is in the underweight range, you may be at increased risk for health problems such as fatigue, lower protection (immunity) against illness, muscle loss, bone loss, hair loss, and hormone problems. BMI is just one measure of your risk for weight-related health problems. You may be at higher risk for health problems if you are not active, you eat an unhealthy diet, or you drink too much alcohol or use tobacco products. Follow-up care is a key part of your treatment and safety. Be sure to make and go to all appointments, and call your doctor if you are having problems. It's also a good idea to know your test results and keep a list of the medicines you take. How can you care for yourself at home? · Practice healthy eating habits. This includes eating plenty of fruits, vegetables, whole grains, lean protein, and low-fat dairy. · If your doctor recommends it, get more exercise. Walking is a good choice. Bit by bit, increase the amount you walk every day. Try for at least 30 minutes on most days of the week. · Do not smoke. Smoking can increase your risk for health problems. If you need help quitting, talk to your doctor about stop-smoking programs and medicines. These can increase your chances of quitting for good. · Limit alcohol to 2 drinks a day for men and 1 drink a day for women. Too much alcohol can cause health problems. If you have a BMI higher than 25  · Your doctor may do other tests to check your risk for weight-related health problems. This may include measuring the distance around your waist. A waist measurement of more than 40 inches in men or 35 inches in women can increase the risk of weight-related health problems. · Talk with your doctor about steps you can take to stay healthy or improve your health. You may need to make lifestyle changes to lose weight and stay healthy, such as changing your diet and getting regular exercise. If you have a BMI lower than 18.5  · Your doctor may do other tests to check your risk for health problems. · Talk with your doctor about steps you can take to stay healthy or improve your health. You may need to make lifestyle changes to gain or maintain weight and stay healthy, such as getting more healthy foods in your diet and doing exercises to build muscle. Where can you learn more? Go to http://bel-clark.info/. Enter S176 in the search box to learn more about \"Body Mass Index: Care Instructions. \"  Current as of: October 13, 2016  Content Version: 11.4  © 3148-7408 Healthwise, Incorporated. Care instructions adapted under license by [employer] (which disclaims liability or warranty for this information). If you have questions about a medical condition or this instruction, always ask your healthcare professional. [place] [address] any warranty or liability for your use of this information. Well Visit, Ages 25 to 48: Care Instructions  Your Care Instructions    Physical exams can help you stay healthy. Your doctor has checked your overall health and may have suggested ways to take good care of yourself. He or she also may have recommended tests.  At home, you can help prevent illness with healthy eating, regular exercise, and other steps. Follow-up care is a key part of your treatment and safety. Be sure to make and go to all appointments, and call your doctor if you are having problems. It's also a good idea to know your test results and keep a list of the medicines you take. How can you care for yourself at home? · Reach and stay at a healthy weight. This will lower your risk for many problems, such as obesity, diabetes, heart disease, and high blood pressure. · Get at least 30 minutes of physical activity on most days of the week. Walking is a good choice. You also may want to do other activities, such as running, swimming, cycling, or playing tennis or team sports. Discuss any changes in your exercise program with your doctor. · Do not smoke or allow others to smoke around you. If you need help quitting, talk to your doctor about stop-smoking programs and medicines. These can increase your chances of quitting for good. · Talk to your doctor about whether you have any risk factors for sexually transmitted infections (STIs). Having one sex partner (who does not have STIs and does not have sex with anyone else) is a good way to avoid these infections. · Use birth control if you do not want to have children at this time. Talk with your doctor about the choices available and what might be best for you. · Protect your skin from too much sun. When you're outdoors from 10 a.m. to 4 p.m., stay in the shade or cover up with clothing and a hat with a wide brim. Wear sunglasses that block UV rays. Even when it's cloudy, put broad-spectrum sunscreen (SPF 30 or higher) on any exposed skin. · See a dentist one or two times a year for checkups and to have your teeth cleaned. · Wear a seat belt in the car. · Drink alcohol in moderation, if at all. That means no more than 2 drinks a day for men and 1 drink a day for women.   Follow your doctor's advice about when to have certain tests. These tests can spot problems early. For everyone  · Cholesterol. Have the fat (cholesterol) in your blood tested after age 21. Your doctor will tell you how often to have this done based on your age, family history, or other things that can increase your risk for heart disease. · Blood pressure. Have your blood pressure checked during a routine doctor visit. Your doctor will tell you how often to check your blood pressure based on your age, your blood pressure results, and other factors. · Vision. Talk with your doctor about how often to have a glaucoma test.  · Diabetes. Ask your doctor whether you should have tests for diabetes. · Colon cancer. Have a test for colon cancer at age 48. You may have one of several tests. If you are younger than 48, you may need a test earlier if you have any risk factors. Risk factors include whether you already had a precancerous polyp removed from your colon or whether your parent, brother, sister, or child has had colon cancer. For women  · Breast exam and mammogram. Talk to your doctor about when you should have a clinical breast exam and a mammogram. Medical experts differ on whether and how often women under 50 should have these tests. Your doctor can help you decide what is right for you. · Pap test and pelvic exam. Begin Pap tests at age 24. A Pap test is the best way to find cervical cancer. The test often is part of a pelvic exam. Ask how often to have this test.  · Tests for sexually transmitted infections (STIs). Ask whether you should have tests for STIs. You may be at risk if you have sex with more than one person, especially if your partners do not wear condoms. For men  · Tests for sexually transmitted infections (STIs). Ask whether you should have tests for STIs. You may be at risk if you have sex with more than one person, especially if you do not wear a condom.   · Testicular cancer exam. Ask your doctor whether you should check your testicles regularly. · Prostate exam. Talk to your doctor about whether you should have a blood test (called a PSA test) for prostate cancer. Experts differ on whether and when men should have this test. Some experts suggest it if you are older than 39 and are -American or have a father or brother who got prostate cancer when he was younger than 72. When should you call for help? Watch closely for changes in your health, and be sure to contact your doctor if you have any problems or symptoms that concern you. Where can you learn more? Go to http://bel-clark.info/. Enter P072 in the search box to learn more about \"Well Visit, Ages 25 to 48: Care Instructions. \"  Current as of: May 12, 2017  Content Version: 11.4  © 5388-7288 Healthwise, Incorporated. Care instructions adapted under license by [employer] (which disclaims liability or warranty for this information). If you have questions about a medical condition or this instruction, always ask your healthcare professional. [place] [address] any warranty or liability for your use of this information.

## 2018-05-31 NOTE — PROGRESS NOTES
Subjective:   30 y.o. pleasant female is here  for Well Woman Check. Doing well . No concerns. Gained weight. She admits that recently she not  active as before . Her gyne and breast care is done elsewhere by her Ob-Gyne physician. Patient Active Problem List   Diagnosis Code    Onychomycosis B35.1     Current Outpatient Prescriptions   Medication Sig Dispense Refill    multivitamin (ONE A DAY) tablet Take 1 Tab by mouth daily.  ferrous sulfate 325 mg (65 mg iron) tablet Take  by mouth Daily (before breakfast).  ASCORBIC ACID/VITAMIN E/BIOTIN (HAIR, SKIN, NAILS WITH BIOTIN PO) Take  by mouth.  VIT A/VIT C/VIT E/ZINC/COPPER (OCUVITE PRESERVISION PO) Take  by mouth. No Known Allergies  Past Medical History:   Diagnosis Date    Anemia      Past Surgical History:   Procedure Laterality Date    HX  SECTION      HX REFRACTIVE SURGERY      HX WISDOM TEETH EXTRACTION       Social History   Substance Use Topics    Smoking status: Never Smoker    Smokeless tobacco: Never Used    Alcohol use No        Lab Results  Component Value Date/Time   Cholesterol, total 221 (H) 2017 08:57 AM   HDL Cholesterol 92 2017 08:57 AM   LDL, calculated 119 (H) 2017 08:57 AM   Triglyceride 48 2017 08:57 AM     Lab Results  Component Value Date/Time   ALT (SGPT) 25 2017 06:31 PM   AST (SGOT) 19 2017 06:31 PM   Alk. phosphatase 40 (L) 2017 06:31 PM   Bilirubin, direct 0.06 10/14/2016 09:16 AM   Bilirubin, total 0.3 2017 06:31 PM   Albumin 3.6 2017 06:31 PM   Protein, total 7.4 2017 06:31 PM   PLATELET 030  06:31 PM       Lab Results  Component Value Date/Time   TSH 1.100 2017 08:57 AM   T4, Free 1.05 2017 08:57 AM      Lab Results   Component Value Date/Time    Hemoglobin A1c 6.0 (H) 2017 08:57 AM         ROS: Feeling generally well. No TIA's or unusual headaches, no dysphagia. No prolonged cough.  No dyspnea or chest pain on exertion. No abdominal pain, change in bowel habits, black or bloody stools. No urinary tract symptoms. No new or unusual musculoskeletal symptoms. Specific concerns today: none. .    Objective: The patient appears well, alert, oriented x 3, in no distress. Visit Vitals    /64 (BP 1 Location: Left arm, BP Patient Position: Sitting)    Pulse 63    Temp 97.1 °F (36.2 °C) (Temporal)    Resp 17    Ht 5' 3.5\" (1.613 m)    Wt 163 lb (73.9 kg)    SpO2 100%    BMI 28.42 kg/m2     ENT normal.  Neck supple. No adenopathy or thyromegaly. MARYURI. Lungs are clear, good air entry, no wheezes, rhonchi or rales. S1 and S2 normal, no murmurs, regular rate and rhythm. Abdomen soft without tenderness, guarding, mass or organomegaly. Extremities show no edema, normal peripheral pulses. Neurological is normal, no focal findings. Breast and Pelvic exams are deferred. Assessment/Plan:   Well Woman  lose weight, increase physical activity, follow low fat diet, follow low salt diet, continue present plan, routine labs ordered, call if any problems    ICD-10-CM ICD-9-CM    1. Well adult health check Z00.00 V70.0 CBC WITH AUTOMATED DIFF      METABOLIC PANEL, COMPREHENSIVE      HEMOGLOBIN A1C WITH EAG      LIPID PANEL      TSH AND FREE T4     Diagnoses and all orders for this visit:    1. Well adult health check  -     CBC WITH AUTOMATED DIFF  -     METABOLIC PANEL, COMPREHENSIVE  -     HEMOGLOBIN A1C WITH EAG  -     LIPID PANEL  -     TSH AND FREE T4    2. BMI 28.0-28.9,adult  Discussed the patient's BMI with her. The BMI follow up plan is as follows:     dietary management education, guidance, and counseling  encourage exercise  monitor weight        Follow-up Disposition:  Return in about 1 year (around 5/31/2019), or if symptoms worsen or fail to improve. reviewed diet, exercise and weight control      An After Visit Summary was printed and given to the patient.

## 2018-05-31 NOTE — MR AVS SNAPSHOT
[address] [facility] [address] [place] [address] 
[phone] Patient: Mabel Dailey MRN: [id number] Visit Information Date & Time Provider Department Dept. Phone Encounter #  
 5/31/2018  1:45 PM Zeeshan Saldaña MD [facility] Internal Medicine [phone] [id number] Follow-up Instructions Return in about 1 year (around 5/31/2019), or if symptoms worsen or fail to improve. Upcoming Health Maintenance Date Due DTaP/Tdap/Td series (1 - Tdap) 11/18/2003 Influenza Age 5 to Adult 8/1/2018 PAP AKA CERVICAL CYTOLOGY 1/18/2021 Allergies as of 5/31/2018  Review Complete On: 5/31/2018 By: Jennifer Miller MD  
 No Known Allergies Current Immunizations  Never Reviewed Name Date Influenza Vaccine (Quad) PF 10/14/2016 Not reviewed this visit You Were Diagnosed With   
  
 Codes Comments Well adult health check    -  Primary ICD-10-CM: Z00.00 ICD-9-CM: V70.0 Vitals BP Pulse Temp Resp Height(growth percentile) 103/64 (BP 1 Location: Left arm, BP Patient Position: Sitting) 63 97.1 °F (36.2 °C) (Temporal) 17 5' 3.5\" (1.613 m) Weight(growth percentile) SpO2 BMI OB Status Smoking Status 163 lb (73.9 kg) 100% 28.42 kg/m2 Implant Never Smoker Vitals History BMI and BSA Data Body Mass Index Body Surface Area  
 28.42 kg/m 2 1.82 m 2 Preferred Pharmacy Pharmacy Name Phone [facility] [address] [place] [address] [phone] Your Updated Medication List  
  
   
This list is accurate as of 5/31/18  2:06 PM.  Always use your most recent med list.  
  
  
  
  
 ferrous sulfate 325 mg (65 mg iron) tablet Take  by mouth Daily (before breakfast). HAIR, SKIN, NAILS WITH BIOTIN PO Take  by mouth.  
  
 multivitamin tablet Commonly known as:  ONE A DAY Take 1 Tab by mouth daily. OCUVITE PRESERVISION PO Take  by mouth. We Performed the Following CBC WITH AUTOMATED DIFF [67223 CPT(R)] HEMOGLOBIN A1C WITH EAG [68237 CPT(R)] LIPID PANEL [69487 CPT(R)] METABOLIC PANEL, COMPREHENSIVE [99643 CPT(R)] TSH AND FREE T4 [40528 CPT(R)] Follow-up Instructions Return in about 1 year (around 5/31/2019), or if symptoms worsen or fail to improve. Patient Instructions Body Mass Index: Care Instructions Your Care Instructions Body mass index (BMI) can help you see if your weight is raising your risk for health problems. It uses a formula to compare how much you weigh with how tall you are. · A BMI lower than 18.5 is considered underweight. · A BMI between 18.5 and 24.9 is considered healthy. · A BMI between 25 and 29.9 is considered overweight. A BMI of 30 or higher is considered obese. If your BMI is in the normal range, it means that you have a lower risk for weight-related health problems. If your BMI is in the overweight or obese range, you may be at increased risk for weight-related health problems, such as high blood pressure, heart disease, stroke, arthritis or joint pain, and diabetes. If your BMI is in the underweight range, you may be at increased risk for health problems such as fatigue, lower protection (immunity) against illness, muscle loss, bone loss, hair loss, and hormone problems. BMI is just one measure of your risk for weight-related health problems. You may be at higher risk for health problems if you are not active, you eat an unhealthy diet, or you drink too much alcohol or use tobacco products. Follow-up care is a key part of your treatment and safety. Be sure to make and go to all appointments, and call your doctor if you are having problems. It's also a good idea to know your test results and keep a list of the medicines you take. How can you care for yourself at home? · Practice healthy eating habits.  This includes eating plenty of fruits, vegetables, whole grains, lean protein, and low-fat dairy. · If your doctor recommends it, get more exercise. Walking is a good choice. Bit by bit, increase the amount you walk every day. Try for at least 30 minutes on most days of the week. · Do not smoke. Smoking can increase your risk for health problems. If you need help quitting, talk to your doctor about stop-smoking programs and medicines. These can increase your chances of quitting for good. · Limit alcohol to 2 drinks a day for men and 1 drink a day for women. Too much alcohol can cause health problems. If you have a BMI higher than 25 · Your doctor may do other tests to check your risk for weight-related health problems. This may include measuring the distance around your waist. A waist measurement of more than 40 inches in men or 35 inches in women can increase the risk of weight-related health problems. · Talk with your doctor about steps you can take to stay healthy or improve your health. You may need to make lifestyle changes to lose weight and stay healthy, such as changing your diet and getting regular exercise. If you have a BMI lower than 18.5 · Your doctor may do other tests to check your risk for health problems. · Talk with your doctor about steps you can take to stay healthy or improve your health. You may need to make lifestyle changes to gain or maintain weight and stay healthy, such as getting more healthy foods in your diet and doing exercises to build muscle. Where can you learn more? Go to http://bel-clark.info/. Enter S176 in the search box to learn more about \"Body Mass Index: Care Instructions. \" Current as of: October 13, 2016 Content Version: 11.4 © 7992-4612 Healthwise, Incorporated. Care instructions adapted under license by [employer] (which disclaims liability or warranty for this information).  If you have questions about a medical condition or this instruction, always ask your healthcare professional. [place] [address] any warranty or liability for your use of this information. Well Visit, Ages 25 to 48: Care Instructions Your Care Instructions Physical exams can help you stay healthy. Your doctor has checked your overall health and may have suggested ways to take good care of yourself. He or she also may have recommended tests. At home, you can help prevent illness with healthy eating, regular exercise, and other steps. Follow-up care is a key part of your treatment and safety. Be sure to make and go to all appointments, and call your doctor if you are having problems. It's also a good idea to know your test results and keep a list of the medicines you take. How can you care for yourself at home? · Reach and stay at a healthy weight. This will lower your risk for many problems, such as obesity, diabetes, heart disease, and high blood pressure. · Get at least 30 minutes of physical activity on most days of the week. Walking is a good choice. You also may want to do other activities, such as running, swimming, cycling, or playing tennis or team sports. Discuss any changes in your exercise program with your doctor. · Do not smoke or allow others to smoke around you. If you need help quitting, talk to your doctor about stop-smoking programs and medicines. These can increase your chances of quitting for good. · Talk to your doctor about whether you have any risk factors for sexually transmitted infections (STIs). Having one sex partner (who does not have STIs and does not have sex with anyone else) is a good way to avoid these infections. · Use birth control if you do not want to have children at this time. Talk with your doctor about the choices available and what might be best for you. · Protect your skin from too much sun. When you're outdoors from 10 a.m. to 4 p.m., stay in the shade or cover up with clothing and a hat with a wide brim. Wear sunglasses that block UV rays. Even when it's cloudy, put broad-spectrum sunscreen (SPF 30 or higher) on any exposed skin. · See a dentist one or two times a year for checkups and to have your teeth cleaned. · Wear a seat belt in the car. · Drink alcohol in moderation, if at all. That means no more than 2 drinks a day for men and 1 drink a day for women. Follow your doctor's advice about when to have certain tests. These tests can spot problems early. For everyone · Cholesterol. Have the fat (cholesterol) in your blood tested after age 21. Your doctor will tell you how often to have this done based on your age, family history, or other things that can increase your risk for heart disease. · Blood pressure. Have your blood pressure checked during a routine doctor visit. Your doctor will tell you how often to check your blood pressure based on your age, your blood pressure results, and other factors. · Vision. Talk with your doctor about how often to have a glaucoma test. 
· Diabetes. Ask your doctor whether you should have tests for diabetes. · Colon cancer. Have a test for colon cancer at age 48. You may have one of several tests. If you are younger than 48, you may need a test earlier if you have any risk factors. Risk factors include whether you already had a precancerous polyp removed from your colon or whether your parent, brother, sister, or child has had colon cancer. For women · Breast exam and mammogram. Talk to your doctor about when you should have a clinical breast exam and a mammogram. Medical experts differ on whether and how often women under 50 should have these tests. Your doctor can help you decide what is right for you. · Pap test and pelvic exam. Begin Pap tests at age 24. A Pap test is the best way to find cervical cancer.  The test often is part of a pelvic exam. Ask how often to have this test. 
 · Tests for sexually transmitted infections (STIs). Ask whether you should have tests for STIs. You may be at risk if you have sex with more than one person, especially if your partners do not wear condoms. For men · Tests for sexually transmitted infections (STIs). Ask whether you should have tests for STIs. You may be at risk if you have sex with more than one person, especially if you do not wear a condom. · Testicular cancer exam. Ask your doctor whether you should check your testicles regularly. · Prostate exam. Talk to your doctor about whether you should have a blood test (called a PSA test) for prostate cancer. Experts differ on whether and when men should have this test. Some experts suggest it if you are older than 39 and are -American or have a father or brother who got prostate cancer when he was younger than 72. When should you call for help? Watch closely for changes in your health, and be sure to contact your doctor if you have any problems or symptoms that concern you. Where can you learn more? Go to http://bel-clark.info/. Enter P072 in the search box to learn more about \"Well Visit, Ages 25 to 48: Care Instructions. \" Current as of: May 12, 2017 Content Version: 11.4 © 2427-6868 Healthwise, [employer]. Care instructions adapted under license by [employer] (which disclaims liability or warranty for this information). If you have questions about a medical condition or this instruction, always ask your healthcare professional. [place] [address] any warranty or liability for your use of this information. Introducing [facility]! Dear Edgardo Dee: Thank you for requesting a [employer] account. Our records indicate that you already have an active [employer] account. You can access your account anytime at [email or web address] Did you know that you can access your hospital and ER discharge instructions at any time in Pathgather? You can also review all of your test results from your hospital stay or ER visit. Additional Information If you have questions, please visit the Frequently Asked Questions section of the Pathgather website at [email or web address] Remember, Pathgather is NOT to be used for urgent needs. For medical emergencies, dial 911. Now available from your iPhone and Android! Please provide this summary of care documentation to your next provider. Your primary care clinician is listed as Zeeshan Merrill. If you have any questions after today's visit, please call [phone].

## 2018-06-01 LAB
ALBUMIN SERPL-MCNC: 4.5 G/DL (ref 3.5–5.5)
ALBUMIN/GLOB SERPL: 1.5 {RATIO} (ref 1.2–2.2)
ALP SERPL-CCNC: 43 IU/L (ref 39–117)
ALT SERPL-CCNC: 14 IU/L (ref 0–32)
AST SERPL-CCNC: 21 IU/L (ref 0–40)
BASOPHILS # BLD AUTO: 0 X10E3/UL (ref 0–0.2)
BASOPHILS NFR BLD AUTO: 1 %
BILIRUB SERPL-MCNC: 0.4 MG/DL (ref 0–1.2)
BUN SERPL-MCNC: 8 MG/DL (ref 6–20)
BUN/CREAT SERPL: 11 (ref 9–23)
CALCIUM SERPL-MCNC: 9.8 MG/DL (ref 8.7–10.2)
CHLORIDE SERPL-SCNC: 100 MMOL/L (ref 96–106)
CHOLEST SERPL-MCNC: 238 MG/DL (ref 100–199)
CO2 SERPL-SCNC: 27 MMOL/L (ref 18–29)
CREAT SERPL-MCNC: 0.75 MG/DL (ref 0.57–1)
EOSINOPHIL # BLD AUTO: 0.1 X10E3/UL (ref 0–0.4)
EOSINOPHIL NFR BLD AUTO: 1 %
ERYTHROCYTE [DISTWIDTH] IN BLOOD BY AUTOMATED COUNT: 13.7 % (ref 12.3–15.4)
EST. AVERAGE GLUCOSE BLD GHB EST-MCNC: 108 MG/DL
GFR SERPLBLD CREATININE-BSD FMLA CKD-EPI: 104 ML/MIN/1.73
GFR SERPLBLD CREATININE-BSD FMLA CKD-EPI: 119 ML/MIN/1.73
GLOBULIN SER CALC-MCNC: 3 G/DL (ref 1.5–4.5)
GLUCOSE SERPL-MCNC: 81 MG/DL (ref 65–99)
HBA1C MFR BLD: 5.4 % (ref 4.8–5.6)
HCT VFR BLD AUTO: 39.1 % (ref 34–46.6)
HDLC SERPL-MCNC: 84 MG/DL
HGB BLD-MCNC: 12.4 G/DL (ref 11.1–15.9)
IMM GRANULOCYTES # BLD: 0 X10E3/UL (ref 0–0.1)
IMM GRANULOCYTES NFR BLD: 0 %
INTERPRETATION, 910389: NORMAL
LDLC SERPL CALC-MCNC: 143 MG/DL (ref 0–99)
LYMPHOCYTES # BLD AUTO: 2.6 X10E3/UL (ref 0.7–3.1)
LYMPHOCYTES NFR BLD AUTO: 37 %
MCH RBC QN AUTO: 29.6 PG (ref 26.6–33)
MCHC RBC AUTO-ENTMCNC: 31.7 G/DL (ref 31.5–35.7)
MCV RBC AUTO: 93 FL (ref 79–97)
MONOCYTES # BLD AUTO: 0.5 X10E3/UL (ref 0.1–0.9)
MONOCYTES NFR BLD AUTO: 7 %
NEUTROPHILS # BLD AUTO: 3.8 X10E3/UL (ref 1.4–7)
NEUTROPHILS NFR BLD AUTO: 54 %
PLATELET # BLD AUTO: 322 X10E3/UL (ref 150–379)
POTASSIUM SERPL-SCNC: 4.4 MMOL/L (ref 3.5–5.2)
PROT SERPL-MCNC: 7.5 G/DL (ref 6–8.5)
RBC # BLD AUTO: 4.19 X10E6/UL (ref 3.77–5.28)
SODIUM SERPL-SCNC: 140 MMOL/L (ref 134–144)
T4 FREE SERPL-MCNC: 1.18 NG/DL (ref 0.82–1.77)
TRIGL SERPL-MCNC: 57 MG/DL (ref 0–149)
TSH SERPL DL<=0.005 MIU/L-ACNC: 0.84 UIU/ML (ref 0.45–4.5)
VLDLC SERPL CALC-MCNC: 11 MG/DL (ref 5–40)
WBC # BLD AUTO: 7.1 X10E3/UL (ref 3.4–10.8)

## 2018-06-01 NOTE — PROGRESS NOTES
Result sent though my chart pre pt request.     1. CBC, kidney, liver, thyroid level is within normal range. 2. Total cholesterol level and the  the bad cholesterol level is  Elevated from last time. No need to start any medication at this point. Continue watching your diet, eat healthy, less mac and fatty food, more baked or grilled or broiled food. Exercise 150 minutes/week will be helpful as well. Will recheck level in 6 months. 3. No diabetes.

## 2018-12-04 ENCOUNTER — OFFICE VISIT (OUTPATIENT)
Dept: SURGERY | Age: 38
End: 2018-12-04

## 2018-12-04 VITALS
TEMPERATURE: 98 F | WEIGHT: 160 LBS | HEART RATE: 65 BPM | DIASTOLIC BLOOD PRESSURE: 70 MMHG | RESPIRATION RATE: 20 BRPM | OXYGEN SATURATION: 100 % | HEIGHT: 64 IN | BODY MASS INDEX: 27.31 KG/M2 | SYSTOLIC BLOOD PRESSURE: 114 MMHG

## 2018-12-04 DIAGNOSIS — K43.9 VENTRAL HERNIA WITHOUT OBSTRUCTION OR GANGRENE: Primary | ICD-10-CM

## 2018-12-04 NOTE — PROGRESS NOTES
Chief Complaint Patient presents with  Possible Hernia  
  seen at the request of ER MD rizvi hernia 1. Have you been to the ER, urgent care clinic since your last visit? Hospitalized since your last visit? New patient 2. Have you seen or consulted any other health care providers outside of the [facility] since your last visit? Include any pap smears or colon screening. New patient. Discussed advanced directive. Patient states that he/she does not have an advanced directive.

## 2018-12-04 NOTE — PROGRESS NOTES
Surgery H&P Subjective:  
Patient 41 y.o. female was seen in our clinic on 9/15/17 for ventral hernia. Patient presented to ER on 17 with painful \"knot\" in the epigastric area. Patient initially noticed it on 17. Patient describes the pain as crampy without radiation. Patient reports that her pain worse after she eats and coughing. Patient also complains of intermittent nausea but no emesis. Patient denies any recent trauma to this area. No change in bowel habits. No diarrhea or constipation. Prior abdominal surgeries include . Patient underwent CT scan in ER and it showed ventral hernia with 4 mm neck with hernia sac measuring 3.4 x 2.2 x 2.1 cm. I personally reviewed the CT scan and patient also noted to have small umbilical hernia with approximately 5 mm defect. Patient presents today to the clinic today because her hernia is getting bigger and more symptomatic. Past Medical & Surgical History: 
Past Medical History:  
Diagnosis Date  Anemia Past Surgical History:  
Procedure Laterality Date  HX  SECTION    HX REFRACTIVE SURGERY    
 HX WISDOM TEETH EXTRACTION Social History: 
Social History Socioeconomic History  Marital status:  Spouse name: Not on file  Number of children: Not on file  Years of education: Not on file  Highest education level: Not on file Social Needs  Financial resource strain: Not on file  Food insecurity - worry: Not on file  Food insecurity - inability: Not on file  Transportation needs - medical: Not on file  Transportation needs - non-medical: Not on file Occupational History  Not on file Tobacco Use  Smoking status: Never Smoker  Smokeless tobacco: Never Used Substance and Sexual Activity  Alcohol use: No  
 Drug use: No  
 Sexual activity: Yes  
  Partners: Male Birth control/protection: Implant Other Topics Concern  Not on file Social History Narrative  Not on file Family History: 
Family History Problem Relation Age of Onset  Hypertension Mother  Hypertension Father  Stroke Paternal Grandmother  Diabetes Paternal Grandmother  Stroke Paternal Grandfather  Cancer Maternal Aunt 60  
     breast cancer Medications: 
Current Outpatient Medications Medication Sig  
 multivitamin (ONE A DAY) tablet Take 1 Tab by mouth daily.  ASCORBIC ACID/VITAMIN E/BIOTIN (HAIR, SKIN, NAILS WITH BIOTIN PO) Take  by mouth.  VIT A/VIT C/VIT E/ZINC/COPPER (OCUVITE PRESERVISION PO) Take  by mouth.  ferrous sulfate 325 mg (65 mg iron) tablet Take  by mouth Daily (before breakfast). No current facility-administered medications for this visit. Allergies: 
No Known Allergies Review of Systems A comprehensive review of systems was negative except for that written in the HPI. Objective:  
 
Exam: 
 
Visit Vitals /70 (BP 1 Location: Right arm, BP Patient Position: Sitting) Pulse 65 Temp 98 °F (36.7 °C) Resp 20 Ht 5' 3.5\" (1.613 m) Wt 72.6 kg (160 lb) SpO2 100% BMI 27.90 kg/m² General appearance: alert, cooperative, no distress, appears stated age Eyes: negative Lungs: clear to auscultation bilaterally Heart: regular rate and rhythm Abdomen: soft, non-tender. Non-distended. Non-reducible tender epigastric bulge measuring 3 x 3 cm. Unable to appreciate the size of the defect. Small umbilical hernia with approximately 0.5 cm fascial defect. Extremities: extremities normal, atraumatic, no cyanosis or edema. IQRA. Skin: Skin color, texture, turgor normal. No rashes or lesions. Neurologic: Grossly normal 
 
 
Assessment:  
 
Ventral hernia, non-reducible Umbilical hernia Plan:  
 
[facility] Risks, benefit, and alternative to surgery was discussed with the patient.   The risks of surgery include but not limited to infection, bleeding, intraabdominal organ injury, recurrence, possible conversion to open, and the risks of general anesthetic. Patient is agreeable to surgery. All questions answered.

## 2018-12-04 NOTE — H&P (VIEW-ONLY)
Surgery H&P Subjective:  
Patient 41 y.o. female was seen in our clinic on 9/15/17 for ventral hernia. Patient presented to ER on 17 with painful \"knot\" in the epigastric area. Patient initially noticed it on 17. Patient describes the pain as crampy without radiation. Patient reports that her pain worse after she eats and coughing. Patient also complains of intermittent nausea but no emesis. Patient denies any recent trauma to this area. No change in bowel habits. No diarrhea or constipation. Prior abdominal surgeries include . Patient underwent CT scan in ER and it showed ventral hernia with 4 mm neck with hernia sac measuring 3.4 x 2.2 x 2.1 cm. I personally reviewed the CT scan and patient also noted to have small umbilical hernia with approximately 5 mm defect. Patient presents today to the clinic today because her hernia is getting bigger and more symptomatic. Past Medical & Surgical History: 
Past Medical History:  
Diagnosis Date  Anemia Past Surgical History:  
Procedure Laterality Date  HX  SECTION    HX REFRACTIVE SURGERY    
 HX WISDOM TEETH EXTRACTION Social History: 
Social History Socioeconomic History  Marital status:  Spouse name: Not on file  Number of children: Not on file  Years of education: Not on file  Highest education level: Not on file Social Needs  Financial resource strain: Not on file  Food insecurity - worry: Not on file  Food insecurity - inability: Not on file  Transportation needs - medical: Not on file  Transportation needs - non-medical: Not on file Occupational History  Not on file Tobacco Use  Smoking status: Never Smoker  Smokeless tobacco: Never Used Substance and Sexual Activity  Alcohol use: No  
 Drug use: No  
 Sexual activity: Yes  
  Partners: Male Birth control/protection: Implant Other Topics Concern  Not on file Social History Narrative  Not on file Family History: 
Family History Problem Relation Age of Onset  Hypertension Mother  Hypertension Father  Stroke Paternal Grandmother  Diabetes Paternal Grandmother  Stroke Paternal Grandfather  Cancer Maternal Aunt 60  
     breast cancer Medications: 
Current Outpatient Medications Medication Sig  
 multivitamin (ONE A DAY) tablet Take 1 Tab by mouth daily.  ASCORBIC ACID/VITAMIN E/BIOTIN (HAIR, SKIN, NAILS WITH BIOTIN PO) Take  by mouth.  VIT A/VIT C/VIT E/ZINC/COPPER (OCUVITE PRESERVISION PO) Take  by mouth.  ferrous sulfate 325 mg (65 mg iron) tablet Take  by mouth Daily (before breakfast). No current facility-administered medications for this visit. Allergies: 
No Known Allergies Review of Systems A comprehensive review of systems was negative except for that written in the HPI. Objective:  
 
Exam: 
 
Visit Vitals /70 (BP 1 Location: Right arm, BP Patient Position: Sitting) Pulse 65 Temp 98 °F (36.7 °C) Resp 20 Ht 5' 3.5\" (1.613 m) Wt 72.6 kg (160 lb) SpO2 100% BMI 27.90 kg/m² General appearance: alert, cooperative, no distress, appears stated age Eyes: negative Lungs: clear to auscultation bilaterally Heart: regular rate and rhythm Abdomen: soft, non-tender. Non-distended. Non-reducible tender epigastric bulge measuring 3 x 3 cm. Unable to appreciate the size of the defect. Small umbilical hernia with approximately 0.5 cm fascial defect. Extremities: extremities normal, atraumatic, no cyanosis or edema. IQRA. Skin: Skin color, texture, turgor normal. No rashes or lesions. Neurologic: Grossly normal 
 
 
Assessment:  
 
Ventral hernia, non-reducible Umbilical hernia Plan:  
 
THE [facility] and [facility] Risks, benefit, and alternative to surgery was discussed with the patient.   The risks of surgery include but not limited to infection, bleeding, intraabdominal organ injury, recurrence, possible conversion to open, and the risks of general anesthetic. Patient is agreeable to surgery. All questions answered.

## 2018-12-05 ENCOUNTER — ANESTHESIA EVENT (OUTPATIENT)
Dept: SURGERY | Age: 38
End: 2018-12-05
Payer: COMMERCIAL

## 2018-12-05 NOTE — PERIOP NOTES
[facility] Preoperative Instructions Surgery Date 12/6/18          Time of Arrival 1000 1. On the day of your surgery, please report to the Surgical Services Registration Desk and sign in at your designated time. The Surgery Center is located to the right of the Emergency Room. 2. You must have someone with you to drive you home. You should not drive a car for 24 hours following surgery. Please make arrangements for a friend or family member to stay with you for the first 24 hours after your surgery. 3. Do not have anything to eat or drink (including water, gum, mints, coffee, juice) after midnight 12/5/18.?? [employer] ? This may not apply to medications prescribed by your physician. ?(Please note below the special instructions with medications to take the morning of your procedure.) 4. We recommend you do not drink any alcoholic beverages for 24 hours before and after your surgery. 5. Contact your surgeons office for instructions on the following medications: non-steroidal anti-inflammatory drugs (i.e. Advil, Aleve), vitamins, and supplements. (Some surgeons will want you to stop these medications prior to surgery and others may allow you to take them) **If you are currently taking Plavix, Coumadin, Aspirin and/or other blood-thinning agents, contact your surgeon for instructions. ** Your surgeon will partner with the physician prescribing these medications to determine if it is safe to stop or if you need to continue taking. Please do not stop taking these medications without instructions from your surgeon 6. Wear comfortable clothes. Wear glasses instead of contacts. Do not bring any money or jewelry. Please bring picture ID, insurance card, and any prearranged co-payment or hospital payment. Do not wear make-up, particularly mascara the morning of your surgery. Do not wear nail polish, particularly if you are having foot /hand surgery.   Wear your hair loose or down, no ponytails, buns, maynor pins or clips. All body piercings must be removed. Please shower with antibacterial soap for three consecutive days before and on the morning of surgery, but do not apply any lotions, powders or deodorants after the shower on the day of surgery. Please use a fresh towels after each shower. Please sleep in clean clothes and change bed linens the night before surgery. Please do not shave for 48 hours prior to surgery. Shaving of the face is acceptable. 7. You should understand that if you do not follow these instructions your surgery may be cancelled. If your physical condition changes (I.e. fever, cold or flu) please contact your surgeon as soon as possible. 8. It is important that you be on time. If a situation occurs where you may be late, please call [phone] (OR Holding Area). 9. If you have any questions and or problems, please call [phone] (Pre-admission Testing). 10. Your surgery time may be subject to change. You will receive a phone call the evening prior if your time changes. 11.  If having outpatient surgery, you must have someone to drive you here, stay with you during the duration of your stay, and to drive you home at time of discharge. 12.   In an effort to improve the efficiency, privacy, and safety for all of our Pre-op patients visitors are not allowed in the Holding area. Once you arrive and are registered your family/visitors will be asked to remain in the waiting room. The Pre-op staff will get you from the Surgical Waiting Area and will explain to you and your family/visitors that the Pre-op phase is beginning. The staff will answer any questions and provide instructions for tracking of the patient, by use of the existing tracking number and color-coded status board in the waiting room.   At this time the staff will also ask for your designated spokesperson information in the event that the physician or staff need to provide an update or obtain any pertinent information. The designated spokesperson will be notified if the physician needs to speak to family during the pre-operative phase. If at any time your family/visitors has questions or concerns they may approach the volunteer desk in the waiting area for assistance. Special Instructions:none MEDICATIONS TO TAKE THE MORNING OF SURGERY WITH A SIP OF WATER:none I understand a pre-operative phone call will be made to verify my surgery time. In the event that I am not available, I give permission for a message to be left on my answering service and/or with another person? {yes @ [phone]. 
 
 
 
 ___________________      __________   _________ 
  (Signature of Patient)             (Witness)                (Date and Time)

## 2018-12-06 ENCOUNTER — HOSPITAL ENCOUNTER (OUTPATIENT)
Age: 38
Setting detail: OUTPATIENT SURGERY
Discharge: HOME | End: 2018-12-06
Attending: SURGERY | Admitting: SURGERY
Payer: COMMERCIAL

## 2018-12-06 ENCOUNTER — ANESTHESIA (OUTPATIENT)
Dept: SURGERY | Age: 38
End: 2018-12-06
Payer: COMMERCIAL

## 2018-12-06 VITALS
HEIGHT: 63 IN | WEIGHT: 160.72 LBS | SYSTOLIC BLOOD PRESSURE: 112 MMHG | HEART RATE: 75 BPM | DIASTOLIC BLOOD PRESSURE: 49 MMHG | RESPIRATION RATE: 15 BRPM | TEMPERATURE: 97.8 F | OXYGEN SATURATION: 97 % | BODY MASS INDEX: 28.48 KG/M2

## 2018-12-06 DIAGNOSIS — R52 PAIN: Primary | ICD-10-CM

## 2018-12-06 LAB
HCG UR QL: NEGATIVE
HGB BLD-MCNC: 11.8 G/DL (ref 11.5–16)

## 2018-12-06 PROCEDURE — 77030019908 HC STETH ESOPH SIMS -A: Performed by: ANESTHESIOLOGY

## 2018-12-06 PROCEDURE — 74011250636 HC RX REV CODE- 250/636: Performed by: ANESTHESIOLOGY

## 2018-12-06 PROCEDURE — 76010000875 HC OR TIME 1.5 TO 2HR INTENSV - TIER 2: Performed by: SURGERY

## 2018-12-06 PROCEDURE — 85018 HEMOGLOBIN: CPT

## 2018-12-06 PROCEDURE — 74011250636 HC RX REV CODE- 250/636: Performed by: SURGERY

## 2018-12-06 PROCEDURE — 77030002933 HC SUT MCRYL J&J -A: Performed by: SURGERY

## 2018-12-06 PROCEDURE — 77030020782 HC GWN BAIR PAWS FLX 3M -B

## 2018-12-06 PROCEDURE — 81025 URINE PREGNANCY TEST: CPT

## 2018-12-06 PROCEDURE — 77030002895 HC DEV VASC CLOSR COVD -B: Performed by: SURGERY

## 2018-12-06 PROCEDURE — C1781 MESH (IMPLANTABLE): HCPCS | Performed by: SURGERY

## 2018-12-06 PROCEDURE — 74011000250 HC RX REV CODE- 250: Performed by: SURGERY

## 2018-12-06 PROCEDURE — 77030011640 HC PAD GRND REM COVD -A: Performed by: SURGERY

## 2018-12-06 PROCEDURE — 76210000021 HC REC RM PH II 0.5 TO 1 HR: Performed by: SURGERY

## 2018-12-06 PROCEDURE — 74011250636 HC RX REV CODE- 250/636

## 2018-12-06 PROCEDURE — 77030035048 HC TRCR ENDOSC OPTCL COVD -B: Performed by: SURGERY

## 2018-12-06 PROCEDURE — 77030018846 HC SOL IRR STRL H20 ICUM -A: Performed by: SURGERY

## 2018-12-06 PROCEDURE — 77030018390 HC SPNG HEMSTAT2 J&J -B: Performed by: SURGERY

## 2018-12-06 PROCEDURE — 74011250637 HC RX REV CODE- 250/637: Performed by: ANESTHESIOLOGY

## 2018-12-06 PROCEDURE — 77030010351 HC TRCR ENDOSC VSTP COVD -B: Performed by: SURGERY

## 2018-12-06 PROCEDURE — 77030022704 HC SUT VLOC COVD -B: Performed by: SURGERY

## 2018-12-06 PROCEDURE — 77030008756 HC TU IRR SUC STRY -B: Performed by: SURGERY

## 2018-12-06 PROCEDURE — 77030016151 HC PROTCTR LNS DFOG COVD -B: Performed by: SURGERY

## 2018-12-06 PROCEDURE — 76210000017 HC OR PH I REC 1.5 TO 2 HR: Performed by: SURGERY

## 2018-12-06 PROCEDURE — 76060000034 HC ANESTHESIA 1.5 TO 2 HR: Performed by: SURGERY

## 2018-12-06 PROCEDURE — 77030013079 HC BLNKT BAIR HGGR 3M -A: Performed by: ANESTHESIOLOGY

## 2018-12-06 PROCEDURE — 77030032490 HC SLV COMPR SCD KNE COVD -B: Performed by: SURGERY

## 2018-12-06 PROCEDURE — 77030026438 HC STYL ET INTUB CARD -A: Performed by: ANESTHESIOLOGY

## 2018-12-06 PROCEDURE — 77030008684 HC TU ET CUF COVD -B: Performed by: ANESTHESIOLOGY

## 2018-12-06 PROCEDURE — 74011000250 HC RX REV CODE- 250

## 2018-12-06 PROCEDURE — 77030039266 HC ADH SKN EXOFIN S2SG -A: Performed by: SURGERY

## 2018-12-06 PROCEDURE — 77030008771 HC TU NG SALEM SUMP -A: Performed by: ANESTHESIOLOGY

## 2018-12-06 PROCEDURE — 77030031139 HC SUT VCRL2 J&J -A: Performed by: SURGERY

## 2018-12-06 PROCEDURE — 77030036554: Performed by: SURGERY

## 2018-12-06 PROCEDURE — 77030003581 HC NDL INSUF VERES COVD -B: Performed by: SURGERY

## 2018-12-06 DEVICE — MESH W/ECHO PS 4X6IN ELLIPSE -- VENTRALIGHT ORDER BY CA: Type: IMPLANTABLE DEVICE | Site: ABDOMEN | Status: FUNCTIONAL

## 2018-12-06 RX ORDER — LIDOCAINE HYDROCHLORIDE 10 MG/ML
0.1 INJECTION, SOLUTION EPIDURAL; INFILTRATION; INTRACAUDAL; PERINEURAL AS NEEDED
Status: DISCONTINUED | OUTPATIENT
Start: 2018-12-06 | End: 2018-12-06 | Stop reason: HOSPADM

## 2018-12-06 RX ORDER — PROPOFOL 10 MG/ML
INJECTION, EMULSION INTRAVENOUS AS NEEDED
Status: DISCONTINUED | OUTPATIENT
Start: 2018-12-06 | End: 2018-12-06 | Stop reason: HOSPADM

## 2018-12-06 RX ORDER — OXYCODONE AND ACETAMINOPHEN 5; 325 MG/1; MG/1
1 TABLET ORAL
Qty: 40 TAB | Refills: 0 | Status: SHIPPED | OUTPATIENT
Start: 2018-12-06 | End: 2019-09-20 | Stop reason: ALTCHOICE

## 2018-12-06 RX ORDER — DIPHENHYDRAMINE HYDROCHLORIDE 50 MG/ML
12.5 INJECTION, SOLUTION INTRAMUSCULAR; INTRAVENOUS AS NEEDED
Status: DISCONTINUED | OUTPATIENT
Start: 2018-12-06 | End: 2018-12-06 | Stop reason: HOSPADM

## 2018-12-06 RX ORDER — SODIUM CHLORIDE 0.9 % (FLUSH) 0.9 %
5-10 SYRINGE (ML) INJECTION AS NEEDED
Status: DISCONTINUED | OUTPATIENT
Start: 2018-12-06 | End: 2018-12-06 | Stop reason: HOSPADM

## 2018-12-06 RX ORDER — FENTANYL CITRATE 50 UG/ML
50 INJECTION, SOLUTION INTRAMUSCULAR; INTRAVENOUS AS NEEDED
Status: DISCONTINUED | OUTPATIENT
Start: 2018-12-06 | End: 2018-12-06 | Stop reason: HOSPADM

## 2018-12-06 RX ORDER — LIDOCAINE HYDROCHLORIDE 20 MG/ML
INJECTION, SOLUTION EPIDURAL; INFILTRATION; INTRACAUDAL; PERINEURAL AS NEEDED
Status: DISCONTINUED | OUTPATIENT
Start: 2018-12-06 | End: 2018-12-06 | Stop reason: HOSPADM

## 2018-12-06 RX ORDER — EPHEDRINE SULFATE 50 MG/ML
INJECTION, SOLUTION INTRAVENOUS AS NEEDED
Status: DISCONTINUED | OUTPATIENT
Start: 2018-12-06 | End: 2018-12-06 | Stop reason: HOSPADM

## 2018-12-06 RX ORDER — SODIUM CHLORIDE, SODIUM LACTATE, POTASSIUM CHLORIDE, CALCIUM CHLORIDE 600; 310; 30; 20 MG/100ML; MG/100ML; MG/100ML; MG/100ML
25 INJECTION, SOLUTION INTRAVENOUS CONTINUOUS
Status: DISCONTINUED | OUTPATIENT
Start: 2018-12-06 | End: 2018-12-06 | Stop reason: HOSPADM

## 2018-12-06 RX ORDER — MIDAZOLAM HYDROCHLORIDE 1 MG/ML
1 INJECTION, SOLUTION INTRAMUSCULAR; INTRAVENOUS AS NEEDED
Status: DISCONTINUED | OUTPATIENT
Start: 2018-12-06 | End: 2018-12-06 | Stop reason: HOSPADM

## 2018-12-06 RX ORDER — MIDAZOLAM HYDROCHLORIDE 1 MG/ML
INJECTION, SOLUTION INTRAMUSCULAR; INTRAVENOUS AS NEEDED
Status: DISCONTINUED | OUTPATIENT
Start: 2018-12-06 | End: 2018-12-06 | Stop reason: HOSPADM

## 2018-12-06 RX ORDER — SODIUM CHLORIDE 0.9 % (FLUSH) 0.9 %
5-10 SYRINGE (ML) INJECTION EVERY 8 HOURS
Status: DISCONTINUED | OUTPATIENT
Start: 2018-12-06 | End: 2018-12-06 | Stop reason: HOSPADM

## 2018-12-06 RX ORDER — MIDAZOLAM HYDROCHLORIDE 1 MG/ML
0.5 INJECTION, SOLUTION INTRAMUSCULAR; INTRAVENOUS
Status: DISCONTINUED | OUTPATIENT
Start: 2018-12-06 | End: 2018-12-06 | Stop reason: HOSPADM

## 2018-12-06 RX ORDER — SODIUM CHLORIDE 9 MG/ML
25 INJECTION, SOLUTION INTRAVENOUS CONTINUOUS
Status: DISCONTINUED | OUTPATIENT
Start: 2018-12-06 | End: 2018-12-06 | Stop reason: HOSPADM

## 2018-12-06 RX ORDER — FENTANYL CITRATE 50 UG/ML
25 INJECTION, SOLUTION INTRAMUSCULAR; INTRAVENOUS
Status: COMPLETED | OUTPATIENT
Start: 2018-12-06 | End: 2018-12-06

## 2018-12-06 RX ORDER — SODIUM CHLORIDE, SODIUM LACTATE, POTASSIUM CHLORIDE, CALCIUM CHLORIDE 600; 310; 30; 20 MG/100ML; MG/100ML; MG/100ML; MG/100ML
125 INJECTION, SOLUTION INTRAVENOUS CONTINUOUS
Status: DISCONTINUED | OUTPATIENT
Start: 2018-12-06 | End: 2018-12-06 | Stop reason: HOSPADM

## 2018-12-06 RX ORDER — OXYCODONE AND ACETAMINOPHEN 5; 325 MG/1; MG/1
1 TABLET ORAL AS NEEDED
Status: DISCONTINUED | OUTPATIENT
Start: 2018-12-06 | End: 2018-12-06 | Stop reason: HOSPADM

## 2018-12-06 RX ORDER — KETOROLAC TROMETHAMINE 30 MG/ML
INJECTION, SOLUTION INTRAMUSCULAR; INTRAVENOUS AS NEEDED
Status: DISCONTINUED | OUTPATIENT
Start: 2018-12-06 | End: 2018-12-06 | Stop reason: HOSPADM

## 2018-12-06 RX ORDER — ONDANSETRON 2 MG/ML
4 INJECTION INTRAMUSCULAR; INTRAVENOUS AS NEEDED
Status: DISCONTINUED | OUTPATIENT
Start: 2018-12-06 | End: 2018-12-06 | Stop reason: HOSPADM

## 2018-12-06 RX ORDER — ACETAMINOPHEN 10 MG/ML
INJECTION, SOLUTION INTRAVENOUS AS NEEDED
Status: DISCONTINUED | OUTPATIENT
Start: 2018-12-06 | End: 2018-12-06 | Stop reason: HOSPADM

## 2018-12-06 RX ORDER — FENTANYL CITRATE 50 UG/ML
INJECTION, SOLUTION INTRAMUSCULAR; INTRAVENOUS AS NEEDED
Status: DISCONTINUED | OUTPATIENT
Start: 2018-12-06 | End: 2018-12-06 | Stop reason: HOSPADM

## 2018-12-06 RX ORDER — ROCURONIUM BROMIDE 10 MG/ML
INJECTION, SOLUTION INTRAVENOUS AS NEEDED
Status: DISCONTINUED | OUTPATIENT
Start: 2018-12-06 | End: 2018-12-06 | Stop reason: HOSPADM

## 2018-12-06 RX ORDER — HYDROMORPHONE HYDROCHLORIDE 2 MG/ML
INJECTION, SOLUTION INTRAMUSCULAR; INTRAVENOUS; SUBCUTANEOUS AS NEEDED
Status: DISCONTINUED | OUTPATIENT
Start: 2018-12-06 | End: 2018-12-06 | Stop reason: HOSPADM

## 2018-12-06 RX ORDER — ONDANSETRON 2 MG/ML
INJECTION INTRAMUSCULAR; INTRAVENOUS AS NEEDED
Status: DISCONTINUED | OUTPATIENT
Start: 2018-12-06 | End: 2018-12-06 | Stop reason: HOSPADM

## 2018-12-06 RX ORDER — MORPHINE SULFATE 10 MG/ML
2 INJECTION, SOLUTION INTRAMUSCULAR; INTRAVENOUS
Status: DISCONTINUED | OUTPATIENT
Start: 2018-12-06 | End: 2018-12-06 | Stop reason: HOSPADM

## 2018-12-06 RX ORDER — HYDROMORPHONE HYDROCHLORIDE 1 MG/ML
0.2 INJECTION, SOLUTION INTRAMUSCULAR; INTRAVENOUS; SUBCUTANEOUS
Status: DISCONTINUED | OUTPATIENT
Start: 2018-12-06 | End: 2018-12-06 | Stop reason: HOSPADM

## 2018-12-06 RX ORDER — BUPIVACAINE HYDROCHLORIDE AND EPINEPHRINE 2.5; 5 MG/ML; UG/ML
INJECTION, SOLUTION EPIDURAL; INFILTRATION; INTRACAUDAL; PERINEURAL AS NEEDED
Status: DISCONTINUED | OUTPATIENT
Start: 2018-12-06 | End: 2018-12-06 | Stop reason: HOSPADM

## 2018-12-06 RX ORDER — CEFAZOLIN SODIUM/WATER 2 G/20 ML
2 SYRINGE (ML) INTRAVENOUS
Status: COMPLETED | OUTPATIENT
Start: 2018-12-06 | End: 2018-12-06

## 2018-12-06 RX ADMIN — MIDAZOLAM HYDROCHLORIDE 2 MG: 1 INJECTION, SOLUTION INTRAMUSCULAR; INTRAVENOUS at 11:53

## 2018-12-06 RX ADMIN — SODIUM CHLORIDE, SODIUM LACTATE, POTASSIUM CHLORIDE, AND CALCIUM CHLORIDE 25 ML/HR: 600; 310; 30; 20 INJECTION, SOLUTION INTRAVENOUS at 11:23

## 2018-12-06 RX ADMIN — ONDANSETRON 4 MG: 2 INJECTION INTRAMUSCULAR; INTRAVENOUS at 13:19

## 2018-12-06 RX ADMIN — Medication 2 G: at 12:08

## 2018-12-06 RX ADMIN — FENTANYL CITRATE 25 MCG: 50 INJECTION, SOLUTION INTRAMUSCULAR; INTRAVENOUS at 14:30

## 2018-12-06 RX ADMIN — ROCURONIUM BROMIDE 50 MG: 10 INJECTION, SOLUTION INTRAVENOUS at 11:56

## 2018-12-06 RX ADMIN — HYDROMORPHONE HYDROCHLORIDE 0.5 MG: 2 INJECTION, SOLUTION INTRAMUSCULAR; INTRAVENOUS; SUBCUTANEOUS at 13:33

## 2018-12-06 RX ADMIN — ONDANSETRON 4 MG: 2 INJECTION INTRAMUSCULAR; INTRAVENOUS at 14:28

## 2018-12-06 RX ADMIN — FENTANYL CITRATE 25 MCG: 50 INJECTION, SOLUTION INTRAMUSCULAR; INTRAVENOUS at 14:35

## 2018-12-06 RX ADMIN — ROCURONIUM BROMIDE 20 MG: 10 INJECTION, SOLUTION INTRAVENOUS at 12:51

## 2018-12-06 RX ADMIN — EPHEDRINE SULFATE 10 MG: 50 INJECTION, SOLUTION INTRAVENOUS at 12:49

## 2018-12-06 RX ADMIN — HYDROMORPHONE HYDROCHLORIDE 0.5 MG: 2 INJECTION, SOLUTION INTRAMUSCULAR; INTRAVENOUS; SUBCUTANEOUS at 13:29

## 2018-12-06 RX ADMIN — PROPOFOL 200 MG: 10 INJECTION, EMULSION INTRAVENOUS at 11:56

## 2018-12-06 RX ADMIN — MIDAZOLAM HYDROCHLORIDE 0.5 MG: 1 INJECTION, SOLUTION INTRAMUSCULAR; INTRAVENOUS at 15:00

## 2018-12-06 RX ADMIN — FENTANYL CITRATE 25 MCG: 50 INJECTION, SOLUTION INTRAMUSCULAR; INTRAVENOUS at 14:45

## 2018-12-06 RX ADMIN — MEPERIDINE HYDROCHLORIDE 12.5 MG: 25 INJECTION, SOLUTION INTRAMUSCULAR; INTRAVENOUS; SUBCUTANEOUS at 14:55

## 2018-12-06 RX ADMIN — HYDROMORPHONE HYDROCHLORIDE 0.5 MG: 2 INJECTION, SOLUTION INTRAMUSCULAR; INTRAVENOUS; SUBCUTANEOUS at 13:39

## 2018-12-06 RX ADMIN — OXYCODONE HYDROCHLORIDE AND ACETAMINOPHEN 1 TABLET: 5; 325 TABLET ORAL at 15:00

## 2018-12-06 RX ADMIN — KETOROLAC TROMETHAMINE 30 MG: 30 INJECTION, SOLUTION INTRAMUSCULAR; INTRAVENOUS at 13:22

## 2018-12-06 RX ADMIN — LIDOCAINE HYDROCHLORIDE 80 MG: 20 INJECTION, SOLUTION EPIDURAL; INFILTRATION; INTRACAUDAL; PERINEURAL at 11:56

## 2018-12-06 RX ADMIN — FENTANYL CITRATE 100 MCG: 50 INJECTION, SOLUTION INTRAMUSCULAR; INTRAVENOUS at 11:56

## 2018-12-06 RX ADMIN — ACETAMINOPHEN 1000 MG: 10 INJECTION, SOLUTION INTRAVENOUS at 12:31

## 2018-12-06 RX ADMIN — SODIUM CHLORIDE, SODIUM LACTATE, POTASSIUM CHLORIDE, AND CALCIUM CHLORIDE: 600; 310; 30; 20 INJECTION, SOLUTION INTRAVENOUS at 13:15

## 2018-12-06 RX ADMIN — HYDROMORPHONE HYDROCHLORIDE 0.5 MG: 2 INJECTION, SOLUTION INTRAMUSCULAR; INTRAVENOUS; SUBCUTANEOUS at 13:36

## 2018-12-06 RX ADMIN — FENTANYL CITRATE 25 MCG: 50 INJECTION, SOLUTION INTRAMUSCULAR; INTRAVENOUS at 14:40

## 2018-12-06 NOTE — ANESTHESIA POSTPROCEDURE EVALUATION
Procedure(s): 
ROBOTIC ASSISTED VENTRAL HERNIA REPAIR AND UMBILICAL HERNIA REPAIR. Anesthesia Post Evaluation Patient location during evaluation: PACU Note status: Adequate. Level of consciousness: responsive to verbal stimuli and sleepy but conscious Pain management: satisfactory to patient Airway patency: patent Anesthetic complications: no 
Cardiovascular status: acceptable Respiratory status: acceptable Hydration status: acceptable Comments: +Post-Anesthesia Evaluation and Assessment Patient: Juan Manuel Case MRN: [id number]  SSN: [id number] YOB: 1980  Age: 41 y.o. Sex: female Cardiovascular Function/Vital Signs /66 (BP 1 Location: Right arm, BP Patient Position: At rest)   Pulse 78   Temp 36.5 °C (97.7 °F)   Resp 18   Ht 5' 3\" (1.6 m)   Wt 72.9 kg (160 lb 11.5 oz)   SpO2 98%   BMI 28.47 kg/m² Patient is status post Procedure(s): 
ROBOTIC ASSISTED VENTRAL HERNIA REPAIR AND UMBILICAL HERNIA REPAIR. Nausea/Vomiting: Controlled. Postoperative hydration reviewed and adequate. Pain: 
Pain Scale 1: Numeric (0 - 10) (12/06/18 1435) Pain Intensity 1: 6 (12/06/18 1435) Managed. Neurological Status:  
Neuro (WDL): Exceptions to WDL (12/06/18 1337) At baseline. Mental Status and Level of Consciousness: Arousable. Pulmonary Status:  
O2 Device: Room air (12/06/18 1430) Adequate oxygenation and airway patent. Complications related to anesthesia: None Post-anesthesia assessment completed. No concerns. Signed By: Tonya Peace MD  
 12/6/2018 Post anesthesia nausea and vomiting:  controlled Visit Vitals /66 (BP 1 Location: Right arm, BP Patient Position: At rest) Pulse 78 Temp 36.5 °C (97.7 °F) Resp 18 Ht 5' 3\" (1.6 m) Wt 72.9 kg (160 lb 11.5 oz) SpO2 98% BMI 28.47 kg/m²

## 2018-12-06 NOTE — DISCHARGE SUMMARY
Patient ID:   Name: Michael Ray   Medical Record Number: [id number]   YOB: 1980    Date of Surgery: 12/6/2018     Preoperative Diagnosis:     1. Incarcerated ventral hernia  2. Umbilical hernia    Postoperative Diagnosis: Same as preoperative diagnosis. Procedures:    1. Allan Ponce incarcerated ventral hernia repair  2. Davinci umbilical hernia repair    Surgeon: Ruma Yepez MD     Surgical assistant:  RYAN Freed    Anesthesia:  General    Estimated Blood Loss:   5 cc    Implants:    Implant Name Type Inv. Item Serial No.  Lot No. LRB No. Used   MESH W/ECHO PS 4X6IN ELLIPSE -- VENTRALIGHT ORDER BY CA - [id number]  MESH W/ECHO PS 4X6IN ELLIPSE -- VENTRALIGHT ORDER BY CA [id number] BARD DAVOL [id number] N/A 1           Specimens:  None    Complication: None    Indications: Patient 41 y.o. female was seen in the ER ER on 9/9/17 with painful \"knot\" in the epigastric area. Patient underwent CT scan in ER and it showed ventral hernia with 4 mm neck with hernia sac measuring 3.4 x 2.2 x 2.1 cm. On examination, patient noted to have non-reducible epigastric bulge and small reducible umbilical hernia. Patient presents today for repair.     Procedure Details: The patient was seen in the Holding Room. The risks, benefits, complications, treatment options, and expected outcomes were discussed with the patient. The site of surgery properly noted/marked. The patient was taken to operating room. The patient was placed supine position. After establishing general anesthesia, the abdomen was prepped and draped in standard surgical fashion. Stab incision was made in the right subcostal area and 8 mm blunt trocar was introduced under direct vision utilizing Optiview technique. The abdomen was insufflated to 15 mmHg of CO2. Additional trocars were placed. A 8 mm trocar was placed in the the right lateral abdomen and a 12 mm Versastep trocar was placed in the right lower quadrant. Abdomen was explored. Patient was noted to have tiny umbilical hernia and epigastric ventral hernia where the falciform ligament inserts. Patient noted to have incarcerated preperitoneal fat within the epigastric fascial defect. Hernia was reduced and the falciform ligament was taken down. Epigastric fascial defect measured 1.5 x 1 cm and the umbilical fascial defect measured 0.5 x 0.5 cm. Two defects were closed with running non-absorbable 0-0 VLoc suture. The hernia repair was then reinforced with 4 x 6 inch ECHO mesh. The mesh was secured to the abdominal wall using running 2-0 absorbable VLoc suture. Hemostasis was good. Trocars were removed. The Skin was closed with subcuticular 4-0 Vicryl followed by Dermabond. Instrument, sponge, and needle counts were correct prior to closure and at the conclusion of the case. The patient was taken to recovery room in good condition having tolerated the procedure well.        CC: Velvet Fischer MD

## 2018-12-06 NOTE — INTERVAL H&P NOTE
H&P Update: 
Shakila Weeks was seen and examined. History and physical has been reviewed. The patient has been examined.  There have been no significant clinical changes since the completion of the originally dated History and Physical. 
 
Signed By: Lucy Amador MD   
 December 6, 2018 10:33 AM

## 2018-12-06 NOTE — ANESTHESIA PREPROCEDURE EVALUATION
Anesthetic History No history of anesthetic complications Review of Systems / Medical History Patient summary reviewed, nursing notes reviewed and pertinent labs reviewed Pulmonary Within defined limits Neuro/Psych Within defined limits Cardiovascular Within defined limits Exercise tolerance: >4 METS 
  
GI/Hepatic/Renal 
  
GERD Endo/Other Within defined limits Other Findings Comments: VENTRAL AND UMBILICAL HERNIA Onychomycosis Physical Exam 
 
Airway Mallampati: I Neck ROM: normal range of motion Mouth opening: Normal 
 
 Cardiovascular Regular rate and rhythm,  S1 and S2 normal,  no murmur, click, rub, or gallop Dental 
No notable dental hx Pulmonary Breath sounds clear to auscultation Abdominal 
GI exam deferred Other Findings Anesthetic Plan ASA: 2 Anesthesia type: general 
 
Monitoring Plan: BIS Induction: Intravenous Anesthetic plan and risks discussed with: Patient

## 2018-12-06 NOTE — DISCHARGE INSTRUCTIONS
Patient Discharge Instructions    Vipin Sousa / [id number] : 1980    Admitted 2018 Discharged: 2018         What to do at Home    Recommended diet: Regular Diet    Recommended activity: no heavy lifting > 20 lbs x 6 weeks; no driving while taking narcotics for pain. May take shower, but no bath x 2 weeks. Keep ice over the incision to minimize swelling. Please wear abdominal binder when ambulating. Please take over the counter stool softener or miralax while taking narcotics for pain. If you experience a lot of drainage, develop redness around the wound, or a fever over 101 F occurs please call the office. Narcotics and anesthesia sometimes cause nausea and vomiting. If persistent please call the office. Do not hesitate to call with questions or concerns. Follow-up with Dr. Vipin Henderson in 2 weeks. Please call [phone] for an appointment. Information obtained by :  I understand that if any problems occur once I am at home I am to contact my physician. I understand and acknowledge receipt of the instructions indicated above.                                                                                                                                                Physician's or R.N.'s Signature                                                                  Date/Time                                                                                                                                              Patient or Representative Signature                                                          Date/Time      DISCHARGE SUMMARY from Nurse    PATIENT INSTRUCTIONS:    After general anesthesia or intravenous sedation, for 24 hours or while taking prescription Narcotics:  · Limit your activities  · Do not drive and operate hazardous machinery  · Do not make important personal or business decisions  · Do  not drink alcoholic beverages  · If you have not urinated within 8 hours after discharge, please contact your surgeon on call. Report the following to your surgeon:  · Excessive pain, swelling, redness or odor of or around the surgical area  · Temperature over 100.5  · Nausea and vomiting lasting longer than 4 hours or if unable to take medications  · Any signs of decreased circulation or nerve impairment to extremity: change in color, persistent  numbness, tingling, coldness or increase pain  · Any questions    What to do at Home:  A common side effect of anesthesia following surgery is nausea and/or vomiting. In order to decrease symptoms, it is wise to avoid foods that are high in fat, greasy foods, milk products, and spicy foods for the first 24 hours. Acceptable foods for the first 24 hours following surgery include but are not limited to:     soup   broth    toast    crackers    applesauce    bananas    mashed potatoes,   soft or scrambled eggs   oatmeal    jello    It is important to eat when taking your pain medication. This will help to prevent nausea. If possible, please try to time your meals with your medications. It is very important to stay hydrated following surgery. Sip fluids frequently while awake. Avoid acidic drinks such as citrus juices and soda for 24 hours. Carbonated beverages may cause bloating and gas. Acceptable fluids include:    - water (flavor packets may add variety)  - coffee or tea (in moderation)  - Gatorade  - Pierre-aid  - apple juice  - cranberry juice    You are encouraged to cough and deep breathe every hour when awake. This will help to prevent respiratory complications following anesthesia. You may want to hug a pillow when coughing and sneezing to add additional support to the surgical area and to decrease discomfort if you had abdominal or chest surgery. If you are discharged home with support stockings, you may remove them after 24 hours.  Support stockings are used to help prevent blood clots in the legs following surgery. Please take time to review all of your Home Care Instructions and Medication Information sheets provided in your discharge packet. If you have any questions, please contact your surgeons office. Thank you. How to Care for Your Wound After Its Treated With  DERMABOND* Topical Skin Adhesive  DERMABOND* Topical Skin Adhesive (2-octyl cyanoacrylate) is a sterile, liquid skin adhesive  that holds wound edges together. The film will usually remain in place for 5 to 10 days, then  naturally fall off your skin. The following will answer some of your questions and provide instructions for proper care for your  wound while it is healing:    CHECK WOUND APPEARANCE   Some swelling, redness, and pain are common with all wounds and normally will go away as the  wound heals. If swelling, redness, or pain increases or if the wound feels warm to the touch,  contact a doctor. Also contact a doctor if the wound edges reopen or separate. REPLACE BANDAGES   If your wound is bandaged, keep the bandage dry.  Replace the dressing daily until the adhesive film has fallen off or if the  bandage should become wet, unless otherwise instructed by your  physician.  When changing the dressing, do not place tape directly over the  DERMABOND adhesive film, because removing the tape later may also  remove the film. AVOID TOPICAL MEDICATIONS   Do not apply liquid or ointment medications or any other product to your wound while the  DERMABOND adhesive film is in place. These may loosen the film before your wound is healed. KEEP WOUND DRY AND PROTECTED   You may occasionally and briefly wet your wound in the shower or bath. Do not soak or scrub  your wound, do not swim, and avoid periods of heavy perspiration until the DERMABOND  adhesive has naturally fallen off. After showering or bathing, gently blot your wound dry with a  soft towel.  If a protective dressing is being used, apply a fresh, dry bandage, being sure to keep  the tape off the DERMABOND adhesive film.  Apply a clean, dry bandage over the wound if necessary to protect it.  Protect your wound from injury until the skin has had sufficient time to heal.   Do not scratch, rub, or pick at the DERMABOND adhesive film. This may loosen the film before  your wound is healed.  Protect the wound from prolonged exposure to sunlight or tanning lamps while the film is in  place. If you have any questions or concerns about this product, please consult your doctor. *Trademark ©[employer] 0991JJ PREVENT AN INFECTION      1. [address] YOUR HANDS     To prevent infection, good handwashing is the most important thing you or your caregiver can do.  Wash your hands with soap and water or use the hand  we gave you before you touch any wounds. 2. SHOWER     Use the antibacterial soap we gave you when you take a shower.  Shower with this soap until your wounds are healed.  To reach all areas of your body, you may need someone to help you.  Dont forget to clean your belly button with every shower. 3.  USE CLEAN SHEETS     Use freshly cleaned sheets on your bed after surgery.  To keep the surgery site clean, do not allow pets to sleep with you while your wound is still healing. 4. STOP SMOKING     Stop smoking, or at least cut back on smoking     Smoking slows your healing. 5.  CONTROL YOUR BLOOD SUGAR     High blood sugars slow wound healing. If you are diabetic, control your blood sugar levels before and after your surgery. Please carry medication information at all times in case of emergency situations. Narcotic-Analgesic/Acetaminophen (Percocet, Norco, Lorcet HD, Lortab 10/325) - (By mouth)   Why this medicine is used:   Relieves pain.   Contact a nurse or doctor right away if you have:  · Extreme weakness, shallow breathing, slow heartbeat  · Severe confusion, lightheadedness, dizziness, fainting  · Yellow skin or eyes, dark urine or pale stools  · Severe constipation, severe stomach pain, nausea, vomiting, loss of appetite  · Sweating or cold, clammy skin     Common side effects:  · Mild constipation, nausea, vomiting  · Sleepiness, tiredness  · Itching, rash  © 2017 [address] Information is for End User's use only and may not be sold, redistributed or otherwise used for commercial purposes. No smoking/ No tobacco products/ Avoid exposure to second hand smoke  Surgeon General's Warning:  Quitting smoking now greatly reduces serious risk to your health. Obesity, smoking, and sedentary lifestyle greatly increases your risk for illness    A healthy diet, regular physical exercise & weight monitoring are important for maintaining a healthy lifestyle    You may be retaining fluid if you have a history of heart failure or if you experience any of the following symptoms:  Weight gain of 3 pounds or more overnight or 5 pounds in a week, increased swelling in our hands or feet or shortness of breath while lying flat in bed. Please call your doctor as soon as you notice any of these symptoms; do not wait until your next office visit. Recognize signs and symptoms of STROKE:    F-face looks uneven    A-arms unable to move or move unevenly    S-speech slurred or non-existent    T-time-call 911 as soon as signs and symptoms begin-DO NOT go       Back to bed or wait to see if you get better-TIME IS BRAIN. Warning Signs of HEART ATTACK     Call 911 if you have these symptoms:   Chest discomfort. Most heart attacks involve discomfort in the center of the chest that lasts more than a few minutes, or that goes away and comes back. It can feel like uncomfortable pressure, squeezing, fullness, or pain.  Discomfort in other areas of the upper body. Symptoms can include pain or discomfort in one or both arms, the back, neck, jaw, or stomach.    Shortness of breath with or without chest discomfort.  Other signs may include breaking out in a cold sweat, nausea, or lightheadedness. Don't wait more than five minutes to call 911 - MINUTES MATTER! Fast action can save your life. Calling 911 is almost always the fastest way to get lifesaving treatment. Emergency Medical Services staff can begin treatment when they arrive -- up to an hour sooner than if someone gets to the hospital by car. The discharge information has been reviewed with the patient and caregiver. The patient and caregiver verbalized understanding. Discharge medications reviewed with the patient and caregiver and appropriate educational materials and side effects teaching were provided.   ___________________________________________________________________________________________________________________________________

## 2018-12-06 NOTE — OP NOTES
Patient ID:   Name: Tegan See   Medical Record Number: [id number]   YOB: 1980     Date of Surgery: 12/6/2018      Preoperative Diagnosis:     1. Incarcerated ventral hernia  2. Umbilical hernia     Postoperative Diagnosis: Same as preoperative diagnosis.     Procedures:    1. Zelda Jj incarcerated ventral hernia repair  2. Davinci umbilical hernia repair     Surgeon: Trevon Goetz MD      Surgical assistant:  RYAN Freed     Anesthesia:  General     Estimated Blood Loss:   5 cc     Implants:    Implant Name Type Inv. Item Serial No.  Lot No. LRB No. Used   MESH W/ECHO PS 4X6IN ELLIPSE -- VENTRALIGHT ORDER BY CA - [id number]   MESH W/ECHO PS 4X6IN ELLIPSE -- VENTRALIGHT ORDER BY CA [id number] BARD DAVOL [id number] N/A 1            Specimens:  None     Complication: None     Indications: Patient 38 y.o. female was seen in the ER ER on 9/9/17 with painful \"knot\" in the epigastric area.  Patient underwent CT scan in ER and it showed ventral hernia with 4 mm neck with hernia sac measuring 3.4 x 2.2 x 2.1 cm. On examination, patient noted to have non-reducible epigastric bulge and small reducible umbilical hernia. Patient presents today for repair.     Procedure Details: The patient was seen in the Holding Room. The risks, benefits, complications, treatment options, and expected outcomes were discussed with the patient. The site of surgery properly noted/marked. The patient was taken to operating room. The patient was placed supine position. After establishing general anesthesia, the abdomen was prepped and draped in standard surgical fashion. Stab incision was made in the right subcostal area and 8 mm blunt trocar was introduced under direct vision utilizing Optiview technique. The abdomen was insufflated to 15 mmHg of CO2. Additional trocars were placed. A 8 mm trocar was placed in the the right lateral abdomen and a 12 mm Versastep trocar was placed in the right lower quadrant.   Abdomen was explored. Patient was noted to have tiny umbilical hernia and epigastric ventral hernia where the falciform ligament inserts. Patient noted to have incarcerated preperitoneal fat within the epigastric fascial defect. Hernia was reduced and the falciform ligament was taken down. Epigastric fascial defect measured 1.5 x 1 cm and the umbilical fascial defect measured 0.5 x 0.5 cm. Two defects were closed with running non-absorbable 0-0 VLoc suture. The hernia repair was then reinforced with 4 x 6 inch ECHO mesh. The mesh was secured to the abdominal wall using running 2-0 absorbable VLoc suture. Hemostasis was good. Trocars were removed. The Skin was closed with subcuticular 4-0 Vicryl followed by Dermabond.      Instrument, sponge, and needle counts were correct prior to closure and at the conclusion of the case.      The patient was taken to recovery room in good condition having tolerated the procedure well.         CC: Nu Tsai MD

## 2018-12-06 NOTE — PERIOP NOTES
Handoff Report from Operating Room to PACU Report received from Ghassan Worthy RN and Bishop Sasha CRNA regarding [facility]. Surgeon(s): 
Jasbir Joseph MD  And Procedure(s) (LRB): 
ROBOTIC ASSISTED VENTRAL HERNIA REPAIR AND UMBILICAL HERNIA REPAIR (N/A)  confirmed with allergies and dressings discussed. Anesthesia type, drugs, patient history, complications, estimated blood loss, vital signs, intake and output, and last pain medication, lines, reversal medications and temperature were reviewed.

## 2018-12-06 NOTE — PERIOP NOTES
Patient discharged to home. Discharge teaching conducted with the patient and her , and included follow up appointments, diet and activity recommendations, basic surgical site care, and medication education. Medication education provided on Percocet and included dosage, timing, safety, and potential side effects. Written discharge materials were provided as well. The patient and her  verbalized understanding of discharge teaching provided and had no further questions or concerns that were expressed at the time of discharge.

## 2018-12-06 NOTE — PERIOP NOTES
Family update provided to the patient's , Carmelina Tolbert. No questions or concerns were expressed at the time of the update.

## 2019-01-04 ENCOUNTER — OFFICE VISIT (OUTPATIENT)
Dept: SURGERY | Age: 39
End: 2019-01-04

## 2019-01-04 VITALS
HEART RATE: 68 BPM | WEIGHT: 162.8 LBS | DIASTOLIC BLOOD PRESSURE: 86 MMHG | SYSTOLIC BLOOD PRESSURE: 124 MMHG | TEMPERATURE: 99.1 F | HEIGHT: 63 IN | BODY MASS INDEX: 28.84 KG/M2 | OXYGEN SATURATION: 100 %

## 2019-01-04 DIAGNOSIS — Z09 POSTOPERATIVE EXAMINATION: Primary | ICD-10-CM

## 2019-01-04 NOTE — PROGRESS NOTES
Patient is here for follow-up. Patient underwent Hank Amando incarcerated [address] [place] [address] and UHR on 12/6/18. Doing well. No complaints. PE: 
Visit Vitals /86 (BP 1 Location: Left arm, BP Patient Position: Sitting) Pulse 68 Temp 99.1 °F (37.3 °C) Ht 5' 3\" (1.6 m) Wt 73.8 kg (162 lb 12.8 oz) SpO2 100% BMI 28.84 kg/m² Abdomen:  Soft, [employer] C/D/I. Assessment:  S/p Davinci incarcerated VHR and UHR Plan: 
-f/u prn

## 2019-01-04 NOTE — PROGRESS NOTES
Chief Complaint Patient presents with  Surgical Follow-up P/O HERNIA 12/6/18 1. Have you been to the ER, urgent care clinic since your last visit? Hospitalized since your last visit? NO 
 
2. Have you seen or consulted any other health care providers outside of the [address] [place] [address] since your last visit? Include any pap smears or colon screening.  NO

## 2019-03-05 ENCOUNTER — OFFICE VISIT (OUTPATIENT)
Dept: PRIMARY CARE CLINIC | Age: 39
End: 2019-03-05

## 2019-03-05 VITALS
HEART RATE: 65 BPM | WEIGHT: 167.2 LBS | OXYGEN SATURATION: 100 % | SYSTOLIC BLOOD PRESSURE: 98 MMHG | TEMPERATURE: 98.7 F | HEIGHT: 63 IN | BODY MASS INDEX: 29.62 KG/M2 | RESPIRATION RATE: 16 BRPM | DIASTOLIC BLOOD PRESSURE: 64 MMHG

## 2019-03-05 DIAGNOSIS — F41.9 ANXIETY AND DEPRESSION: Primary | ICD-10-CM

## 2019-03-05 DIAGNOSIS — F32.A ANXIETY AND DEPRESSION: Primary | ICD-10-CM

## 2019-03-05 RX ORDER — PAROXETINE 10 MG/1
TABLET, FILM COATED ORAL
Qty: 48 TAB | Refills: 0 | Status: SHIPPED | OUTPATIENT
Start: 2019-03-05 | End: 2019-04-02 | Stop reason: SDUPTHER

## 2019-03-05 RX ORDER — NORETHINDRONE ACETATE AND ETHINYL ESTRADIOL AND FERROUS FUMARATE 1MG-20(21)
KIT ORAL
Refills: 11 | COMMUNITY
Start: 2019-02-26 | End: 2019-09-20 | Stop reason: ALTCHOICE

## 2019-03-05 NOTE — PROGRESS NOTES
Subjective:     Chief Complaint   Patient presents with    Other     feeling depressed, crying randomly, highs and lows for 1 year ever since her uncle who was like a father to her         She  is a 41y.o. year old female with no significant medical history who presents today with concern about feeling depressed, lack of concentration, lack of energy, worry a lot, lack of sleep notice by family and friends for the past one year. She reports that her mom notice her symptoms started after her uncle passed away about a year ago who was 2 nd father like to her. States that she becomes emotionall very easily, crying randomly. Reports that she has started doing exercise to see if that helps. NISA score is 18    Pertinent items are noted in HPI. Objective:     Vitals:    03/05/19 1538   BP: 98/64   Pulse: 65   Resp: 16   Temp: 98.7 °F (37.1 °C)   TempSrc: Oral   SpO2: 100%   Weight: 167 lb 3.2 oz (75.8 kg)   Height: 5' 3\" (1.6 m)       Physical Examination: General appearance - alert, well appearing, and in no distress, oriented to person, place, and time and overweight  Mental status - alert, oriented to person, place, and time, normal mood,  speech, dress, motor activity, and thought . She is tearful but consolable.    Neck - supple, no significant adenopathy  Chest - clear to auscultation, no wheezes, rales or rhonchi, symmetric air entry  Heart - normal rate, regular rhythm, normal S1, S2, no murmurs, rubs, clicks or gallops    No Known Allergies   Social History     Socioeconomic History    Marital status:      Spouse name: Not on file    Number of children: Not on file    Years of education: Not on file    Highest education level: Not on file   Tobacco Use    Smoking status: Never Smoker    Smokeless tobacco: Never Used   Substance and Sexual Activity    Alcohol use: Yes     Comment: rarely    Drug use: Yes     Types: Prescription, OTC    Sexual activity: Yes     Partners: Male     Birth control/protection: Implant      Family History   Problem Relation Age of Onset    Hypertension Mother     Hypertension Father     Stroke Paternal Grandmother     Diabetes Paternal Grandmother     Stroke Paternal Grandfather     Cancer Maternal Aunt 60        breast cancer      Past Surgical History:   Procedure Laterality Date    HX  SECTION      HX HERNIA REPAIR  2018    HX REFRACTIVE SURGERY      HX WISDOM TEETH EXTRACTION        Past Medical History:   Diagnosis Date    Anemia     GERD (gastroesophageal reflux disease)       Current Outpatient Medications   Medication Sig Dispense Refill    JUNEL FE , 28, 1 mg-20 mcg (21)/75 mg (7) tab TK 1 T PO QD  11    oxyCODONE-acetaminophen (PERCOCET) 5-325 mg per tablet Take 1 Tab by mouth every four (4) hours as needed for Pain. Max Daily Amount: 6 Tabs. 40 Tab 0    multivitamin (ONE A DAY) tablet Take 1 Tab by mouth daily.  ferrous sulfate 325 mg (65 mg iron) tablet Take  by mouth as needed.  ASCORBIC ACID/VITAMIN E/BIOTIN (HAIR, SKIN, NAILS WITH BIOTIN PO) Take  by mouth as needed.  VIT A/VIT C/VIT E/ZINC/COPPER (OCUVITE PRESERVISION PO) Take  by mouth as needed. Assessment/ Plan:   Diagnoses and all orders for this visit:    1. Anxiety and depression  -     TSH AND FREE T4  -    After discussing options we have decided to start with  PARoxetine (PAXIL) 10 mg tablet; One tab daily PO or a week then 2 tab daily afterwards        -    Advised to make appointment with psychotherapist.         - counseling provided. Medication risks/benefits/costs/interactions/alternatives discussed with patient. Advised patient to call back or return to office if symptoms worsen/change/persist. If patient cannot reach us or should anything more severe/urgent arise he/she should proceed directly to the nearest emergency department.   Discussed expected course/resolution/complications of diagnosis in detail with patient. Patient given a written after visit summary which includes her diagnoses, current medications and vitals. Patient expressed understanding with the diagnosis and plan. Follow-up Disposition:  Return in about 2 months (around 5/5/2019), or if symptoms worsen or fail to improve, for anxiety.

## 2019-03-06 LAB
T4 FREE SERPL-MCNC: 1.04 NG/DL (ref 0.82–1.77)
TSH SERPL DL<=0.005 MIU/L-ACNC: 0.71 UIU/ML (ref 0.45–4.5)

## 2019-04-02 DIAGNOSIS — F41.9 ANXIETY AND DEPRESSION: ICD-10-CM

## 2019-04-02 DIAGNOSIS — F32.A ANXIETY AND DEPRESSION: ICD-10-CM

## 2019-04-02 RX ORDER — PAROXETINE 10 MG/1
TABLET, FILM COATED ORAL
Qty: 180 TAB | Refills: 0 | Status: SHIPPED | OUTPATIENT
Start: 2019-04-02 | End: 2019-09-20 | Stop reason: SDUPTHER

## 2019-09-20 ENCOUNTER — OFFICE VISIT (OUTPATIENT)
Dept: PRIMARY CARE CLINIC | Age: 39
End: 2019-09-20

## 2019-09-20 VITALS
HEART RATE: 73 BPM | DIASTOLIC BLOOD PRESSURE: 75 MMHG | RESPIRATION RATE: 16 BRPM | WEIGHT: 179.8 LBS | SYSTOLIC BLOOD PRESSURE: 123 MMHG | HEIGHT: 63 IN | OXYGEN SATURATION: 99 % | BODY MASS INDEX: 31.86 KG/M2 | TEMPERATURE: 99.7 F

## 2019-09-20 DIAGNOSIS — Z00.00 WELL ADULT ON ROUTINE HEALTH CHECK: Primary | ICD-10-CM

## 2019-09-20 DIAGNOSIS — F32.A ANXIETY AND DEPRESSION: ICD-10-CM

## 2019-09-20 DIAGNOSIS — F41.9 ANXIETY AND DEPRESSION: ICD-10-CM

## 2019-09-20 RX ORDER — PAROXETINE 10 MG/1
10 TABLET, FILM COATED ORAL DAILY
Qty: 90 TAB | Refills: 1 | Status: SHIPPED | OUTPATIENT
Start: 2019-09-20 | End: 2019-10-29 | Stop reason: SDUPTHER

## 2019-09-20 NOTE — PROGRESS NOTES
Chief Complaint   Patient presents with    Physical     no other concerns    Medication Refill     Paxil refill     1. Have you been to the ER, urgent care clinic since your last visit? Hospitalized since your last visit? No    2. Have you seen or consulted any other health care providers outside of the [address] [place] [address] since your last visit? Include any pap smears or colon screening.  No

## 2019-09-20 NOTE — PROGRESS NOTES
Subjective:   41 y.o. female for Well Woman Check. Her gyne and breast care is done elsewhere by her Ob-Gyne physician. Patient was started on Paxil 20 mg about 6 months ago. She reports that she has been feeling great. Some days she takes just 10 mg and somedays she takes 20. For the past few days she has been taking 10 mg and she is fine. She needs refill . Patient Active Problem List   Diagnosis Code    Onychomycosis B35.1     Current Outpatient Medications   Medication Sig Dispense Refill    PARoxetine (PAXIL) 10 mg tablet TAKE 1 TABLET BY MOUTH DAILY FOR 1 WEEK, THEN 2 TABLETS DAILY 180 Tab 0     No Known Allergies  Past Medical History:   Diagnosis Date    Anemia     GERD (gastroesophageal reflux disease)      Past Surgical History:   Procedure Laterality Date    HX  SECTION      HX HERNIA REPAIR  2018    HX REFRACTIVE SURGERY      HX WISDOM TEETH EXTRACTION       Family History   Problem Relation Age of Onset    Hypertension Mother     Hypertension Father     Stroke Paternal Grandmother     Diabetes Paternal Grandmother     Stroke Paternal Grandfather     Cancer Maternal Aunt 60        breast cancer     Social History     Tobacco Use    Smoking status: Never Smoker    Smokeless tobacco: Never Used   Substance Use Topics    Alcohol use: Yes     Comment: rarely        Lab Results   Component Value Date/Time    WBC 7.1 2018 02:10 PM    HGB 12.4 2018 02:10 PM    Hemoglobin (POC) 11.8 2018 11:19 AM    HCT 39.1 2018 02:10 PM    PLATELET 436  02:10 PM    MCV 93 2018 02:10 PM     Lab Results   Component Value Date/Time    Cholesterol, total 238 (H) 2018 02:10 PM    HDL Cholesterol 84 2018 02:10 PM    LDL, calculated 143 (H) 2018 02:10 PM    Triglyceride 57 2018 02:10 PM     Lab Results   Component Value Date/Time    ALT (SGPT) 14 2018 02:10 PM    AST (SGOT) 21 2018 02:10 PM    Alk.  phosphatase 43 05/31/2018 02:10 PM    Bilirubin, direct 0.06 10/14/2016 09:16 AM    Bilirubin, total 0.4 05/31/2018 02:10 PM    Albumin 4.5 05/31/2018 02:10 PM    Protein, total 7.5 05/31/2018 02:10 PM    PLATELET 156 69/25/7766 02:10 PM     Lab Results   Component Value Date/Time    GFR est non- 05/31/2018 02:10 PM    GFRNA, POC >60 09/09/2017 06:32 PM    GFR est  05/31/2018 02:10 PM    GFRAA, POC >60 09/09/2017 06:32 PM    Creatinine 0.75 05/31/2018 02:10 PM    Creatinine (POC) 0.8 09/09/2017 06:32 PM    BUN 8 05/31/2018 02:10 PM    Sodium 140 05/31/2018 02:10 PM    Potassium 4.4 05/31/2018 02:10 PM    Chloride 100 05/31/2018 02:10 PM    CO2 27 05/31/2018 02:10 PM     Lab Results   Component Value Date/Time    TSH 0.708 03/05/2019 04:31 PM    T4, Free 1.04 03/05/2019 04:31 PM      Lab Results   Component Value Date/Time    Hemoglobin A1c 5.4 05/31/2018 02:10 PM         ROS: Feeling generally well. No TIA's or unusual headaches, no dysphagia. No prolonged cough. No dyspnea or chest pain on exertion. No abdominal pain, change in bowel habits, black or bloody stools. No urinary tract symptoms. No new or unusual musculoskeletal symptoms. Specific concerns today: none. .    Objective: The patient appears well, alert, oriented x 3, in no distress. Visit Vitals  Ht 5' 3\" (1.6 m)   Wt 179 lb 12.8 oz (81.6 kg)   BMI 31.85 kg/m²     ENT normal.  Neck supple. No adenopathy or thyromegaly. MARYURI. Lungs are clear, good air entry, no wheezes, rhonchi or rales. S1 and S2 normal, no murmurs, regular rate and rhythm. Abdomen soft without tenderness, guarding, mass or organomegaly. Extremities show no edema, normal peripheral pulses. Neurological is normal, no focal findings. Breast and Pelvic exams are deferred. Assessment/Plan:   Well Woman  lose weight, increase physical activity, follow low fat diet, follow low salt diet, continue present plan, routine labs ordered, call if any problems    ICD-10-CM ICD-9-CM    1. Well adult on routine health check Z00.00 V70.0 LIPID PANEL      METABOLIC PANEL, COMPREHENSIVE      HEMOGLOBIN A1C WITH EAG      THYROID CASCADE PROFILE      CBC WITH AUTOMATED DIFF   2. Anxiety and depression F41.9 300.00 PARoxetine (PAXIL) 10 mg tablet    F32.9 311      Diagnoses and all orders for this visit:    1. Well adult on routine health check  -     LIPID PANEL  -     METABOLIC PANEL, COMPREHENSIVE  -     HEMOGLOBIN A1C WITH EAG  -     THYROID CASCADE PROFILE  -     CBC WITH AUTOMATED DIFF    2. Anxiety and depression  -    Well controled with  PARoxetine (PAXIL) 10 mg tablet; Take 1 Tab by mouth daily. Follow-up and Dispositions    · Return in about 6 months (around 3/20/2020), or if symptoms worsen or fail to improve. current treatment plan is effective, no change in therapy  reviewed diet, exercise and weight control  Medication risks/benefits/costs/interactions/alternatives discussed with patient. Advised patient to call back or return to office if symptoms worsen/change/persist. If patient cannot reach us or should anything more severe/urgent arise he/she should proceed directly to the nearest emergency department. Discussed expected course/resolution/complications of diagnosis in detail with patient. Patient given a written after visit summary which includes her diagnoses, current medications and vitals. Patient expressed understanding with the diagnosis and plan.

## 2019-09-21 LAB
ALBUMIN SERPL-MCNC: 4.2 G/DL (ref 3.5–5.5)
ALBUMIN/GLOB SERPL: 1.6 {RATIO} (ref 1.2–2.2)
ALP SERPL-CCNC: 40 IU/L (ref 39–117)
ALT SERPL-CCNC: 22 IU/L (ref 0–32)
AST SERPL-CCNC: 37 IU/L (ref 0–40)
BASOPHILS # BLD AUTO: 0 X10E3/UL (ref 0–0.2)
BASOPHILS NFR BLD AUTO: 0 %
BILIRUB SERPL-MCNC: 0.6 MG/DL (ref 0–1.2)
BUN SERPL-MCNC: 11 MG/DL (ref 6–20)
BUN/CREAT SERPL: 13 (ref 9–23)
CALCIUM SERPL-MCNC: 9.1 MG/DL (ref 8.7–10.2)
CHLORIDE SERPL-SCNC: 100 MMOL/L (ref 96–106)
CHOLEST SERPL-MCNC: 237 MG/DL (ref 100–199)
CO2 SERPL-SCNC: 22 MMOL/L (ref 20–29)
CREAT SERPL-MCNC: 0.83 MG/DL (ref 0.57–1)
EOSINOPHIL # BLD AUTO: 0 X10E3/UL (ref 0–0.4)
EOSINOPHIL NFR BLD AUTO: 0 %
ERYTHROCYTE [DISTWIDTH] IN BLOOD BY AUTOMATED COUNT: 13.6 % (ref 12.3–15.4)
EST. AVERAGE GLUCOSE BLD GHB EST-MCNC: 114 MG/DL
GLOBULIN SER CALC-MCNC: 2.6 G/DL (ref 1.5–4.5)
GLUCOSE SERPL-MCNC: 83 MG/DL (ref 65–99)
HBA1C MFR BLD: 5.6 % (ref 4.8–5.6)
HCT VFR BLD AUTO: 38.3 % (ref 34–46.6)
HDLC SERPL-MCNC: 84 MG/DL
HGB BLD-MCNC: 12.6 G/DL (ref 11.1–15.9)
IMM GRANULOCYTES # BLD AUTO: 0 X10E3/UL (ref 0–0.1)
IMM GRANULOCYTES NFR BLD AUTO: 0 %
LDLC SERPL CALC-MCNC: 137 MG/DL (ref 0–99)
LYMPHOCYTES # BLD AUTO: 1.8 X10E3/UL (ref 0.7–3.1)
LYMPHOCYTES NFR BLD AUTO: 20 %
MCH RBC QN AUTO: 31.1 PG (ref 26.6–33)
MCHC RBC AUTO-ENTMCNC: 32.9 G/DL (ref 31.5–35.7)
MCV RBC AUTO: 95 FL (ref 79–97)
MONOCYTES # BLD AUTO: 0.5 X10E3/UL (ref 0.1–0.9)
MONOCYTES NFR BLD AUTO: 6 %
NEUTROPHILS # BLD AUTO: 6.8 X10E3/UL (ref 1.4–7)
NEUTROPHILS NFR BLD AUTO: 74 %
PLATELET # BLD AUTO: 337 X10E3/UL (ref 150–450)
POTASSIUM SERPL-SCNC: 3.8 MMOL/L (ref 3.5–5.2)
PROT SERPL-MCNC: 6.8 G/DL (ref 6–8.5)
RBC # BLD AUTO: 4.05 X10E6/UL (ref 3.77–5.28)
SODIUM SERPL-SCNC: 136 MMOL/L (ref 134–144)
TRIGL SERPL-MCNC: 78 MG/DL (ref 0–149)
TSH SERPL DL<=0.005 MIU/L-ACNC: 0.81 UIU/ML (ref 0.45–4.5)
VLDLC SERPL CALC-MCNC: 16 MG/DL (ref 5–40)
WBC # BLD AUTO: 9.1 X10E3/UL (ref 3.4–10.8)

## 2019-09-23 NOTE — PROGRESS NOTES
Result sent through my chart. Helpablo Ms. Garcia Dines you are doing well.      Your recent lab results showed,    1. CBC, kidney, liver, thyroid  level is within normal range. 2. Total cholesterol level and  bad cholesterol level is slightly better than last time but still  mildly elevated. No need to start any medication. Continue watching your diet, eat healthy, less mac and fatty food, more baked or grilled or broiled food. Exercise 150 minutes/week will be helpful as well. Will recheck level in one year. 3. No diabetes. Please let me know if you have any questions. Thanks.     Dr. Guero Colon

## 2019-10-29 DIAGNOSIS — F41.9 ANXIETY AND DEPRESSION: ICD-10-CM

## 2019-10-29 DIAGNOSIS — F32.A ANXIETY AND DEPRESSION: ICD-10-CM

## 2019-10-29 RX ORDER — PAROXETINE 10 MG/1
10 TABLET, FILM COATED ORAL DAILY
Qty: 90 TAB | Refills: 1 | Status: SHIPPED | OUTPATIENT
Start: 2019-10-29 | End: 2020-03-19

## 2020-08-18 ENCOUNTER — OFFICE VISIT (OUTPATIENT)
Dept: PRIMARY CARE CLINIC | Age: 40
End: 2020-08-18
Payer: COMMERCIAL

## 2020-08-18 VITALS
RESPIRATION RATE: 17 BRPM | WEIGHT: 195.8 LBS | HEIGHT: 63 IN | HEART RATE: 71 BPM | TEMPERATURE: 97.2 F | DIASTOLIC BLOOD PRESSURE: 71 MMHG | SYSTOLIC BLOOD PRESSURE: 104 MMHG | BODY MASS INDEX: 34.69 KG/M2 | OXYGEN SATURATION: 98 %

## 2020-08-18 DIAGNOSIS — Z00.00 WELL ADULT ON ROUTINE HEALTH CHECK: Primary | ICD-10-CM

## 2020-08-18 DIAGNOSIS — F41.9 ANXIETY AND DEPRESSION: ICD-10-CM

## 2020-08-18 DIAGNOSIS — F32.A ANXIETY AND DEPRESSION: ICD-10-CM

## 2020-08-18 LAB
ALBUMIN SERPL-MCNC: 3.6 G/DL (ref 3.5–5)
ALBUMIN/GLOB SERPL: 1 {RATIO} (ref 1.1–2.2)
ALP SERPL-CCNC: 60 U/L (ref 45–117)
ALT SERPL-CCNC: 23 U/L (ref 12–78)
ANION GAP SERPL CALC-SCNC: 6 MMOL/L (ref 5–15)
AST SERPL-CCNC: 21 U/L (ref 15–37)
BASOPHILS # BLD: 0.1 K/UL (ref 0–0.1)
BASOPHILS NFR BLD: 1 % (ref 0–1)
BILIRUB SERPL-MCNC: 0.5 MG/DL (ref 0.2–1)
BUN SERPL-MCNC: 12 MG/DL (ref 6–20)
BUN/CREAT SERPL: 14 (ref 12–20)
CALCIUM SERPL-MCNC: 9 MG/DL (ref 8.5–10.1)
CHLORIDE SERPL-SCNC: 109 MMOL/L (ref 97–108)
CHOLEST SERPL-MCNC: 269 MG/DL
CO2 SERPL-SCNC: 24 MMOL/L (ref 21–32)
CREAT SERPL-MCNC: 0.88 MG/DL (ref 0.55–1.02)
DIFFERENTIAL METHOD BLD: NORMAL
EOSINOPHIL # BLD: 0.1 K/UL (ref 0–0.4)
EOSINOPHIL NFR BLD: 1 % (ref 0–7)
ERYTHROCYTE [DISTWIDTH] IN BLOOD BY AUTOMATED COUNT: 13.3 % (ref 11.5–14.5)
EST. AVERAGE GLUCOSE BLD GHB EST-MCNC: 114 MG/DL
GLOBULIN SER CALC-MCNC: 3.7 G/DL (ref 2–4)
GLUCOSE SERPL-MCNC: 83 MG/DL (ref 65–100)
HBA1C MFR BLD: 5.6 % (ref 4–5.6)
HCT VFR BLD AUTO: 39.5 % (ref 35–47)
HDLC SERPL-MCNC: 78 MG/DL
HDLC SERPL: 3.4 {RATIO} (ref 0–5)
HGB BLD-MCNC: 12.3 G/DL (ref 11.5–16)
IMM GRANULOCYTES # BLD AUTO: 0 K/UL (ref 0–0.04)
IMM GRANULOCYTES NFR BLD AUTO: 0 % (ref 0–0.5)
LDLC SERPL CALC-MCNC: 177 MG/DL (ref 0–100)
LIPID PROFILE,FLP: ABNORMAL
LYMPHOCYTES # BLD: 2.2 K/UL (ref 0.8–3.5)
LYMPHOCYTES NFR BLD: 33 % (ref 12–49)
MCH RBC QN AUTO: 29.6 PG (ref 26–34)
MCHC RBC AUTO-ENTMCNC: 31.1 G/DL (ref 30–36.5)
MCV RBC AUTO: 95 FL (ref 80–99)
MONOCYTES # BLD: 0.5 K/UL (ref 0–1)
MONOCYTES NFR BLD: 8 % (ref 5–13)
NEUTS SEG # BLD: 3.8 K/UL (ref 1.8–8)
NEUTS SEG NFR BLD: 57 % (ref 32–75)
NRBC # BLD: 0 K/UL (ref 0–0.01)
NRBC BLD-RTO: 0 PER 100 WBC
PLATELET # BLD AUTO: 398 K/UL (ref 150–400)
PMV BLD AUTO: 10.3 FL (ref 8.9–12.9)
POTASSIUM SERPL-SCNC: 4 MMOL/L (ref 3.5–5.1)
PROT SERPL-MCNC: 7.3 G/DL (ref 6.4–8.2)
RBC # BLD AUTO: 4.16 M/UL (ref 3.8–5.2)
SODIUM SERPL-SCNC: 139 MMOL/L (ref 136–145)
TRIGL SERPL-MCNC: 70 MG/DL (ref ?–150)
VLDLC SERPL CALC-MCNC: 14 MG/DL
WBC # BLD AUTO: 6.6 K/UL (ref 3.6–11)

## 2020-08-18 PROCEDURE — 99395 PREV VISIT EST AGE 18-39: CPT | Performed by: FAMILY MEDICINE

## 2020-08-18 RX ORDER — NORETHINDRONE ACETATE AND ETHINYL ESTRADIOL AND FERROUS FUMARATE 1MG-20(21)
KIT ORAL
COMMUNITY
Start: 2020-07-29

## 2020-08-18 RX ORDER — PAROXETINE 10 MG/1
TABLET, FILM COATED ORAL
Qty: 90 TAB | Refills: 1 | Status: SHIPPED | OUTPATIENT
Start: 2020-08-18 | End: 2021-09-17 | Stop reason: SDUPTHER

## 2020-08-18 NOTE — PROGRESS NOTES
Subjective:   42 y.o. female for Well Woman Check. Her gyne and breast care is done elsewhere by her Ob-Gyne physician. Patient reports that her anxiety and depression has been well controlled with Paxil. She mention that for the past one week she has pain around her right eye with no associated blurry vision, N/V, sinus congestion, cough. Wondering if this is sinus. Advised to try mist inhalation, tylenol/advil. F/u if not better. She was not able to do exercise due to the pandemic however she started exercising again. No other concerns.      Patient Active Problem List   Diagnosis Code    Onychomycosis B35.1     Current Outpatient Medications   Medication Sig Dispense Refill    Microgestin FE 1/20, 28, 1 mg-20 mcg (21)/75 mg (7) tab TK 1 T PO QD      PARoxetine (PAXIL) 10 mg tablet TAKE 1 TABLET BY MOUTH EVERY DAY 90 Tab 1     No Known Allergies  Past Medical History:   Diagnosis Date    Anemia     GERD (gastroesophageal reflux disease)         Lab Results   Component Value Date/Time    WBC 9.1 09/20/2019 11:04 AM    HGB 12.6 09/20/2019 11:04 AM    Hemoglobin (POC) 11.8 12/06/2018 11:19 AM    HCT 38.3 09/20/2019 11:04 AM    PLATELET 300 85/60/6346 11:04 AM    MCV 95 09/20/2019 11:04 AM     Lab Results   Component Value Date/Time    Cholesterol, total 237 (H) 09/20/2019 11:04 AM    HDL Cholesterol 84 09/20/2019 11:04 AM    LDL, calculated 137 (H) 09/20/2019 11:04 AM    Triglyceride 78 09/20/2019 11:04 AM     Lab Results   Component Value Date/Time    GFR est non-AA 91 09/20/2019 11:04 AM    GFRNA, POC >60 09/09/2017 06:32 PM    GFR est  09/20/2019 11:04 AM    GFRAA, POC >60 09/09/2017 06:32 PM    Creatinine 0.83 09/20/2019 11:04 AM    Creatinine (POC) 0.8 09/09/2017 06:32 PM    BUN 11 09/20/2019 11:04 AM    Sodium 136 09/20/2019 11:04 AM    Potassium 3.8 09/20/2019 11:04 AM    Chloride 100 09/20/2019 11:04 AM    CO2 22 09/20/2019 11:04 AM     Lab Results   Component Value Date/Time    TSH 0.807 09/20/2019 11:04 AM    TSH 0.708 03/05/2019 04:31 PM    T4, Free 1.04 03/05/2019 04:31 PM      Lab Results   Component Value Date/Time    Hemoglobin A1c 5.6 09/20/2019 11:04 AM         ROS: Feeling generally well. No TIA's or unusual headaches, no dysphagia. No prolonged cough. No dyspnea or chest pain on exertion. No abdominal pain, change in bowel habits, black or bloody stools. No urinary tract symptoms. No new or unusual musculoskeletal symptoms. Specific concerns today: none. Objective: The patient appears well, alert, oriented x 3, in no distress. Visit Vitals  /71 (BP 1 Location: Left arm, BP Patient Position: Sitting)   Pulse 71   Temp 97.2 °F (36.2 °C) (Temporal)   Resp 17   Ht 5' 3\" (1.6 m)   Wt 195 lb 12.8 oz (88.8 kg)   SpO2 98%   BMI 34.68 kg/m²     ENT normal.  Neck supple. No adenopathy or thyromegaly. MARYURI. Lungs are clear, good air entry, no wheezes, rhonchi or rales. S1 and S2 normal, no murmurs, regular rate and rhythm. Abdomen soft without tenderness, guarding, mass or organomegaly. Extremities show no edema, normal peripheral pulses. Neurological is normal, no focal findings. Breast and Pelvic exams are deferred. Assessment/Plan:   Well Woman  lose weight, increase physical activity, follow low fat diet, follow low salt diet, continue present plan, routine labs ordered, call if any problems    ICD-10-CM ICD-9-CM    1. Well adult on routine health check  Z00.00 V70.0 THYROID CASCADE PROFILE      HEMOGLOBIN A1C WITH EAG      METABOLIC PANEL, COMPREHENSIVE      LIPID PANEL      CBC WITH AUTOMATED DIFF   2. Anxiety and depression  F41.9 300.00 PARoxetine (PAXIL) 10 mg tablet    F32.9 311    3. BMI 34.0-34.9,adult  Z68.34 V85.34      Diagnoses and all orders for this visit:    1. Well adult on routine health check  -     THYROID CASCADE PROFILE  -     HEMOGLOBIN A1C WITH EAG  -     METABOLIC PANEL, COMPREHENSIVE  -     LIPID PANEL  -     CBC WITH AUTOMATED DIFF    2.  Anxiety and depression  -     PARoxetine (PAXIL) 10 mg tablet; TAKE 1 TABLET BY MOUTH EVERY DAY    3. BMI 34.0-34.9,adult        Encouraged to work on diet and exercise. Follow-up and Dispositions    · Return in about 6 months (around 2/18/2021) for anxiety.        current treatment plan is effective, no change in therapy  reviewed diet, exercise and weight control  reviewed medications and side effects in detail

## 2020-08-18 NOTE — PROGRESS NOTES
Juhi Calles is a 42 y.o. female    Chief Complaint   Patient presents with    Complete Physical     fasting       1. Have you been to the ER, urgent care clinic since your last visit? Hospitalized since your last visit? No    2. Have you seen or consulted any other health care providers outside of the [facility] since your last visit? Include any pap smears or colon screening. No    No flowsheet data found.      Health Maintenance Due   Topic Date Due    DTaP/Tdap/Td series (1 - Tdap) 11/18/2003    Influenza Age 5 to Adult  08/01/2020

## 2020-08-19 DIAGNOSIS — E78.5 HYPERLIPIDEMIA LDL GOAL <100: Primary | ICD-10-CM

## 2020-08-19 LAB — TSH SERPL-ACNC: 1.2 UIU/ML (ref 0.45–4.5)

## 2020-08-19 NOTE — PROGRESS NOTES
Sent via my chart. Hi Ms. Meredith Schmidt! Hope you are doing well! I have reviewed your lab results and all came back good except cholesterol level . Cholesterol level significantly went up from last time. LDL level went from 137 to 177 which is very high. I would like to repeat cholesterol level in 3 months. Please have your fasting lab drawn 2-3 days prior to your appointment with me. Order will be in the chart. Please work on diet and exercise as we have discussed. Please let me know if you have any questions. Thanks.     Dr. Bhavana Zhu

## 2021-02-19 DIAGNOSIS — E78.5 HYPERLIPIDEMIA LDL GOAL <100: ICD-10-CM

## 2021-09-17 ENCOUNTER — OFFICE VISIT (OUTPATIENT)
Dept: PRIMARY CARE CLINIC | Age: 41
End: 2021-09-17
Payer: COMMERCIAL

## 2021-09-17 VITALS
WEIGHT: 186.2 LBS | DIASTOLIC BLOOD PRESSURE: 75 MMHG | HEART RATE: 80 BPM | HEIGHT: 63 IN | SYSTOLIC BLOOD PRESSURE: 119 MMHG | TEMPERATURE: 97.4 F | RESPIRATION RATE: 18 BRPM | OXYGEN SATURATION: 98 % | BODY MASS INDEX: 32.99 KG/M2

## 2021-09-17 DIAGNOSIS — L67.8 ABNORMAL FACIAL HAIR: ICD-10-CM

## 2021-09-17 DIAGNOSIS — Z13.6 ENCOUNTER FOR SCREENING FOR CARDIOVASCULAR DISORDERS: ICD-10-CM

## 2021-09-17 DIAGNOSIS — Z13.31 DEPRESSION SCREEN: ICD-10-CM

## 2021-09-17 DIAGNOSIS — F41.1 GENERALIZED ANXIETY DISORDER: ICD-10-CM

## 2021-09-17 DIAGNOSIS — Z00.00 WELL WOMAN EXAM (NO GYNECOLOGICAL EXAM): ICD-10-CM

## 2021-09-17 DIAGNOSIS — N94.6 DYSMENORRHEA: ICD-10-CM

## 2021-09-17 DIAGNOSIS — Z00.00 ENCOUNTER FOR MEDICAL EXAMINATION TO ESTABLISH CARE: Primary | ICD-10-CM

## 2021-09-17 DIAGNOSIS — E66.9 OBESITY (BMI 30-39.9): ICD-10-CM

## 2021-09-17 DIAGNOSIS — Z11.59 NEED FOR HEPATITIS C SCREENING TEST: ICD-10-CM

## 2021-09-17 PROCEDURE — 99385 PREV VISIT NEW AGE 18-39: CPT | Performed by: FAMILY MEDICINE

## 2021-09-17 RX ORDER — PAROXETINE 10 MG/1
TABLET, FILM COATED ORAL
Qty: 90 TABLET | Refills: 1 | Status: SHIPPED | OUTPATIENT
Start: 2021-09-17 | End: 2022-01-21

## 2021-09-17 NOTE — PROGRESS NOTES
HPI     Chief Complaint:   Chief Complaint   Patient presents with   [facility] Establish Care     See OB/GYN yearly. Facial hair       Sharon Jennings is an 47 y.o. female. Patient was previously receiving care at: Dr. Sharon Jennings history significant for: none    Concerns for today's visit: facial hair worsening over the past few months. Mostly on chin. Has to tweeze several times a day. No hair loss otherwise. No known issues of thyroid issues. Works out at least a few times a week. Tries to eat healthy. Gyn Care  Menses sparse on birth control. Dr. Gi Monique does pap smears. Last pap smears HPV+ with normal cytology. Colposcopy normal, recommended repeat pap in 1 year. Family History  Paternal aunt with breast cancer   No FH of colon cancer     Social History  Denies smoking . Denies heavy alcohol use. No elicit drug use. Health Maintenance reviewed -  HIV testing UTD and negative  Hepatitis C testing reviewed    Current Outpatient Medications   Medication Sig    PARoxetine (PAXIL) 10 mg tablet TAKE 1 TABLET BY MOUTH EVERY DAY    Microgestin FE /20, 28, 1 mg-20 mcg (21)/75 mg (7) tab TK 1 T PO QD     No current facility-administered medications for this visit.        Allergies - reviewed:   No Known Allergies      Past Medical History - reviewed:  Past Medical History:   Diagnosis Date    Anemia     GERD (gastroesophageal reflux disease)        Social History - reviewed:  Social History     Tobacco Use    Smoking status: Never Smoker    Smokeless tobacco: Never Used   Substance Use Topics    Alcohol use: Yes     Comment: rarely    Drug use: Yes     Types: Prescription, OTC       Past Surgical History - reviewed:  Past Surgical History:   Procedure Laterality Date    HX  SECTION      HX HERNIA REPAIR  2018    HX REFRACTIVE SURGERY      HX WISDOM TEETH EXTRACTION           Family History - reviewed:  Family History   Problem Relation Age of Onset    Hypertension Mother     Hypertension Father     Stroke Paternal Grandmother     Diabetes Paternal Grandmother     Stroke Paternal Grandfather     Cancer Maternal Aunt 60        breast cancer       Immunizations - reviewed:   Immunization History   Administered Date(s) Administered    COVID-19, PFIZER, MRNA, LNP-S, PF, 30MCG/0.3ML DOSE 04/03/2021, 05/01/2021    Influenza Vaccine 09/18/2018    Influenza Vaccine (Quad) Intradermal PF 09/02/2021    Influenza Vaccine (Quad) PF (>6 Mo Flulaval, Fluarix, and >3 Yrs Afluria, Fluzone 36616) 10/14/2016, 09/04/2019       Review of Systems   Review of Systems   Constitutional: Negative for chills and fever. Eyes: Negative for discharge and redness. Respiratory: Negative for cough and shortness of breath. Cardiovascular: Negative for chest pain and palpitations. Gastrointestinal: Negative for abdominal pain and blood in stool. Musculoskeletal: Negative for falls and myalgias. Skin: Negative for rash. Neurological: Negative for focal weakness and headaches. Endo/Heme/Allergies: Negative for polydipsia. Does not bruise/bleed easily. Psychiatric/Behavioral: Negative for hallucinations and substance abuse. Reviewed PmHx, FmHx, SocHx as well as meds and allergies, updated and dated in the chart. Objective     Visit Vitals  /75 (BP 1 Location: Left upper arm, BP Patient Position: Sitting, BP Cuff Size: Adult)   Pulse 80   Temp 97.4 °F (36.3 °C) (Temporal)   Resp 18   Ht 5' 3\" (1.6 m)   Wt 186 lb 3.2 oz (84.5 kg)   SpO2 98%   BMI 32.98 kg/m²       Physical Exam  Vitals and nursing note reviewed. Constitutional:       General: She is not in acute distress. Appearance: Normal appearance. HENT:      Head: Normocephalic and atraumatic. Right Ear: External ear normal.      Left Ear: External ear normal.      Nose: Nose normal. No rhinorrhea. Mouth/Throat:      Mouth: Mucous membranes are moist.      Pharynx: No oropharyngeal exudate. Eyes:      Extraocular Movements: Extraocular movements intact. Conjunctiva/sclera: Conjunctivae normal.      Pupils: Pupils are equal, round, and reactive to light. Cardiovascular:      Rate and Rhythm: Normal rate and regular rhythm. Heart sounds: No murmur heard. Pulmonary:      Effort: Pulmonary effort is normal. No respiratory distress. Breath sounds: Normal breath sounds. Abdominal:      General: Bowel sounds are normal.      Palpations: Abdomen is soft. Tenderness: There is no abdominal tenderness. Musculoskeletal:      Cervical back: Normal range of motion and neck supple. Skin:     General: Skin is warm. Coloration: Skin is not jaundiced. Comments: Coarse facial hair noted to chin. Neurological:      General: No focal deficit present. Mental Status: She is alert and oriented to person, place, and time. Psychiatric:         Mood and Affect: Mood normal.         Behavior: Behavior normal.             Assessment and Plan     Diagnoses and all orders for this visit:    1. Encounter for medical examination to establish care    2. Well woman exam (no gynecological exam)    3. Abnormal facial hair  -     TESTOSTERONE, TOTAL, FEMALE/CHILD  -     TSH RFX ON ABNORMAL TO FREE T4    4. Generalized anxiety disorder: stable  -     CBC W/O DIFF  -     TSH RFX ON ABNORMAL TO FREE T4  -     PARoxetine (PAXIL) 10 mg tablet; TAKE 1 TABLET BY MOUTH EVERY DAY    5. Dysmenorrhea: okay on OCPs    6. Depression screen: negative  -     KS PT-FOCUSED HLTH RISK ASSMT SCORE DOC STND INSTRM    7. Obesity (BMI 44-84.8)  -     METABOLIC PANEL, COMPREHENSIVE    8. Encounter for screening for cardiovascular disorders  -     LIPID PANEL    9. Need for hepatitis C screening test  -     HEPATITIS C AB       Follow-up and Dispositions    · Return in about 6 months (around 3/17/2022) for chronic follow up.          I discussed the aforementioned diagnoses with the patient as well as the plan of care. All questions were answered and an AVS was provided.        Lorrie Bingham MD  [facility]  [place] [id number], [place], [address] [place] [address]

## 2021-09-17 NOTE — PROGRESS NOTES
Visit Vitals  /75 (BP 1 Location: Left upper arm, BP Patient Position: Sitting, BP Cuff Size: Adult)   Pulse 80   Temp 97.4 °F (36.3 °C) (Temporal)   Resp 18   Ht 5' 3\" (1.6 m)   Wt 186 lb 3.2 oz (84.5 kg)   SpO2 98%   BMI 32.98 kg/m²     Chief Complaint   Patient presents with   [address]     See OB/GYN yearly. Facial hair     1. Have you been to the ER, urgent care clinic since your last visit? Hospitalized since your last visit? No    2. Have you seen or consulted any other health care providers outside of the [address] [place] [address] since your last visit? Include any pap smears or colon screening.  OB/GYN

## 2021-09-20 LAB
ALBUMIN SERPL-MCNC: 4.5 G/DL (ref 3.8–4.8)
ALBUMIN/GLOB SERPL: 1.9 {RATIO} (ref 1.2–2.2)
ALP SERPL-CCNC: 54 IU/L (ref 44–121)
ALT SERPL-CCNC: 33 IU/L (ref 0–32)
AST SERPL-CCNC: 27 IU/L (ref 0–40)
BILIRUB SERPL-MCNC: 0.5 MG/DL (ref 0–1.2)
BUN SERPL-MCNC: 8 MG/DL (ref 6–20)
BUN/CREAT SERPL: 9 (ref 9–23)
CALCIUM SERPL-MCNC: 9.4 MG/DL (ref 8.7–10.2)
CHLORIDE SERPL-SCNC: 104 MMOL/L (ref 96–106)
CHOLEST SERPL-MCNC: 211 MG/DL (ref 100–199)
CO2 SERPL-SCNC: 23 MMOL/L (ref 20–29)
CREAT SERPL-MCNC: 0.87 MG/DL (ref 0.57–1)
ERYTHROCYTE [DISTWIDTH] IN BLOOD BY AUTOMATED COUNT: 12.6 % (ref 11.7–15.4)
GLOBULIN SER CALC-MCNC: 2.4 G/DL (ref 1.5–4.5)
GLUCOSE SERPL-MCNC: 92 MG/DL (ref 65–99)
HCT VFR BLD AUTO: 37 % (ref 34–46.6)
HCV AB S/CO SERPL IA: <0.1 S/CO RATIO (ref 0–0.9)
HDLC SERPL-MCNC: 87 MG/DL
HGB BLD-MCNC: 12.5 G/DL (ref 11.1–15.9)
LDLC SERPL CALC-MCNC: 112 MG/DL (ref 0–99)
MCH RBC QN AUTO: 30.9 PG (ref 26.6–33)
MCHC RBC AUTO-ENTMCNC: 33.8 G/DL (ref 31.5–35.7)
MCV RBC AUTO: 92 FL (ref 79–97)
PLATELET # BLD AUTO: 361 X10E3/UL (ref 150–450)
POTASSIUM SERPL-SCNC: 4.4 MMOL/L (ref 3.5–5.2)
PROT SERPL-MCNC: 6.9 G/DL (ref 6–8.5)
RBC # BLD AUTO: 4.04 X10E6/UL (ref 3.77–5.28)
SODIUM SERPL-SCNC: 142 MMOL/L (ref 134–144)
TESTOST SERPL-MCNC: 42.5 NG/DL (ref 10–55)
TRIGL SERPL-MCNC: 66 MG/DL (ref 0–149)
TSH SERPL DL<=0.005 MIU/L-ACNC: 1.05 UIU/ML (ref 0.45–4.5)
VLDLC SERPL CALC-MCNC: 12 MG/DL (ref 5–40)
WBC # BLD AUTO: 6.5 X10E3/UL (ref 3.4–10.8)

## 2022-01-20 DIAGNOSIS — F41.1 GENERALIZED ANXIETY DISORDER: ICD-10-CM

## 2022-01-21 RX ORDER — PAROXETINE 10 MG/1
TABLET, FILM COATED ORAL
Qty: 90 TABLET | Refills: 1 | Status: SHIPPED | OUTPATIENT
Start: 2022-01-21

## 2022-03-18 PROBLEM — L67.8 ABNORMAL FACIAL HAIR: Status: ACTIVE | Noted: 2021-09-17

## 2022-03-19 PROBLEM — F41.1 GENERALIZED ANXIETY DISORDER: Status: ACTIVE | Noted: 2021-09-17

## 2022-09-29 ENCOUNTER — TELEPHONE (OUTPATIENT)
Dept: PRIMARY CARE CLINIC | Age: 42
End: 2022-09-29

## 2022-09-29 NOTE — TELEPHONE ENCOUNTER
----- Message from Christina Camps sent at 9/29/2022  9:49 AM EDT -----  Subject: Appointment Request    Reason for Call: Established Patient Appointment needed: Routine Physical   Exam    QUESTIONS    Reason for appointment request? No appointments available during search     Additional Information for Provider? Patient would like to schedule   physical during the week of oct 10th. Nov 8th or Nov 11th.  Any provider   available.   ---------------------------------------------------------------------------  --------------  Papo Becerra [facility]  [phone]; OK to leave message on voicemail  ---------------------------------------------------------------------------  --------------  SCRIPT ANSWERS  MISTY Screen: Mahendra Bryan

## 2023-05-21 RX ORDER — PAROXETINE 10 MG/1
1 TABLET, FILM COATED ORAL DAILY
COMMUNITY
Start: 2022-01-21

## 2023-05-21 RX ORDER — NORETHINDRONE ACETATE AND ETHINYL ESTRADIOL 1MG-20(21)
1 KIT ORAL DAILY
COMMUNITY
Start: 2020-07-29

## 2024-04-24 NOTE — PROGRESS NOTES
Chief Complaint   Patient presents with    Other     feeling depressed, crying randomly, highs and lows for 1 year ever since her uncle who was like a father to her      1. Have you been to the ER, urgent care clinic since your last visit? Hospitalized since your last visit? No    2. Have you seen or consulted any other health care providers outside of the [address] [place] [address] since your last visit? Include any pap smears or colon screening.  No Losartan 25 mg started recently.  Blood pressure today 140/70 mm Hg.  I told the patient that increasing losartan to 50 would be advisable if the blood pressures remain elevated.

## (undated) DEVICE — TIP COVER ACCESSORY

## (undated) DEVICE — NEEDLE HYPO 25GA L1.5IN BVL ORIENTED ECLIPSE

## (undated) DEVICE — BLADE ASSEMB CLP HAIR FINE --

## (undated) DEVICE — SUTURE DEV SZ 0 L6IN N ABSORBABLE

## (undated) DEVICE — SPONGE HEMOSTAT CELLULS 4X8IN -- SURGICEL

## (undated) DEVICE — BLADELESS OPTICAL TROCAR WITH FIXATION CANNULA: Brand: VERSAPORT

## (undated) DEVICE — BINDER ABD M/L H12IN FOR 46-62IN WHT 4 SLD PNL DSGN HOOP

## (undated) DEVICE — SOLUTION IRRIG 1000ML H2O STRL BLT

## (undated) DEVICE — VISUALIZATION SYSTEM: Brand: CLEARIFY

## (undated) DEVICE — Device

## (undated) DEVICE — INFECTION CONTROL KIT SYS

## (undated) DEVICE — KENDALL SCD EXPRESS SLEEVES, KNEE LENGTH, MEDIUM: Brand: KENDALL SCD

## (undated) DEVICE — REM POLYHESIVE ADULT PATIENT RETURN ELECTRODE: Brand: VALLEYLAB

## (undated) DEVICE — DEVON™ KNEE AND BODY STRAP 60" X 3" (1.5 M X 7.6 CM): Brand: DEVON

## (undated) DEVICE — DRAPE,REIN 53X77,STERILE: Brand: MEDLINE

## (undated) DEVICE — TOWEL SURG W17XL27IN STD BLU COT NONFENESTRATED PREWASHED

## (undated) DEVICE — TROCAR SITE CLOSURE DEVICE: Brand: ENDO CLOSE

## (undated) DEVICE — SURGICAL PROCEDURE KIT GEN LAPAROSCOPY LF

## (undated) DEVICE — STERILE POLYISOPRENE POWDER-FREE SURGICAL GLOVES: Brand: PROTEXIS

## (undated) DEVICE — SUTURE MCRYL SZ 4-0 L27IN ABSRB UD L19MM PS-2 1/2 CIR PRIM Y426H

## (undated) DEVICE — (D)PREP SKN CHLRAPRP APPL 26ML -- CONVERT TO ITEM 371833

## (undated) DEVICE — SUTURE ABSORBABLE MONOFILAMENT 2-0 WND CLOSURE GRN V-LOC 180 VLOCL0315

## (undated) DEVICE — SUTURE V LOC 1 L18IN NONABSORBABLE GS-21 TAPERPOINT NDL VLOCN0327

## (undated) DEVICE — MAGNETIC IMPLANT S1000-00: Brand: SOPHONO™

## (undated) DEVICE — HANDLE LT SNAP ON ULT DURABLE LENS FOR TRUMPF ALC DISPOSABLE

## (undated) DEVICE — SUTURE SZ 0 27IN 5/8 CIR UR-6  TAPER PT VIOLET ABSRB VICRYL J603H

## (undated) DEVICE — DILATOR AND CANNULA WITH RADIALLY EXPANDABLE SLEEVE: Brand: VERSASTEP PLUS

## (undated) DEVICE — STERILE POLYISOPRENE POWDER-FREE SURGICAL GLOVES WITH EMOLLIENT COATING: Brand: PROTEXIS

## (undated) DEVICE — APPLICATOR BNDG 1MM ADH PREMIERPRO EXOFIN